# Patient Record
Sex: MALE | Race: WHITE | Employment: FULL TIME | URBAN - METROPOLITAN AREA
[De-identification: names, ages, dates, MRNs, and addresses within clinical notes are randomized per-mention and may not be internally consistent; named-entity substitution may affect disease eponyms.]

---

## 2017-04-06 DIAGNOSIS — I10 HYPERTENSION, GOAL BELOW 140/90: ICD-10-CM

## 2017-04-07 NOTE — TELEPHONE ENCOUNTER
lisinopril       Last Written Prescription Date: 2/15/16  Last Fill Quantity: 90, # refills: 3  Last Office Visit with G, P or [facility] prescribing provider: 2/15/16       Potassium   Date Value Ref Range Status   02/15/2016 4.0 3.4 - 5.3 mmol/L Final     Creatinine   Date Value Ref Range Status   02/15/2016 0.96 0.66 - 1.25 mg/dL Final     BP Readings from Last 3 Encounters:   02/15/16 116/80   07/23/15 (!) 147/98   04/14/15 124/84

## 2017-04-11 RX ORDER — LISINOPRIL 40 MG/1
40 TABLET ORAL DAILY
Qty: 30 TABLET | Refills: 0 | Status: SHIPPED | OUTPATIENT
Start: 2017-04-11 | End: 2017-05-12

## 2017-04-11 NOTE — TELEPHONE ENCOUNTER
Medication is being filled for 1 time refill only due to:   Over due for office visit and/or labs   JANIE Navarro  RN/Carl Mendez

## 2017-05-05 NOTE — PROGRESS NOTES
"  SUBJECTIVE:                                                    Mani Vu is a 47 year old male who presents to clinic today for the following health issues:      Hypertension Follow-up  Lisinopril 40mg qd, Chlorthalidone 25mg qd    Outpatient blood pressures are not being checked.    Low Salt Diet: low salt         Amount of exercise or physical activity: 2-3 days/week for an average of 15-30 minutes    Problems taking medications regularly: No    Medication side effects: Urinary frequency - Initially was tolerable, but now has been more disruptive. Urinating ~15 times per day. Wakes 2-5 times per night to urinate.    Diet: low salt      Family History: NO family history of prostate cancer      Reviewed and updated as needed this visit by clinical staff  Tobacco  Allergies  Meds  Med Hx  Surg Hx  Fam Hx  Soc Hx      Reviewed and updated as needed this visit by Provider         ROS:  C: NEGATIVE for fever, chills, change in weight  CV: NEGATIVE for chest pain, palpitations or peripheral edema  : POSITIVE for urinary frequency, nocturia. NEGATIVE for dysuria or hematuria  NEURO: NEGATIVE for dizziness or weakness      This document serves as a record of the services and decisions personally performed and made by Shania Mclain MD. It was created on his behalf by Shannan Richards, a trained medical scribe. The creation of this document is based the provider's statements to the medical scribe.  Shannan Richards 1:25 PM May 12, 2017    OBJECTIVE:                                                    /78  Pulse 76  Temp 97.5  F (36.4  C) (Tympanic)  Ht 5' 8\" (1.727 m)  Wt 202 lb 2 oz (91.7 kg)  BMI 30.73 kg/m2  Body mass index is 30.73 kg/(m^2).     GENERAL: healthy, alert and no distress  RESP: lungs clear to auscultation - no rales, rhonchi or wheezes  CV: regular rate and rhythm, normal S1 S2, no murmur, no peripheral edema  MS: no gross musculoskeletal defects noted, no edema       ASSESSMENT/PLAN:    "                                                   (I10) Hypertension, goal below 140/90  (primary encounter diagnosis)  Comment: /78, within guidelines. Due to side effect of urinary frequency, will discontinue chlorthalidone and try calcium channel blocker. Check renal function.  Plan: Basic metabolic panel  (Ca, Cl, CO2, Creat,         Gluc, K, Na, BUN), Albumin Random Urine         Quantitative, lisinopril (PRINIVIL/ZESTRIL) 40         MG tablet, amLODIPine (NORVASC) 5 MG tablet - Reviewed potential side effects of amlodipine, including LE edema. Results pending.        Patient will follow up via MyChart, phone, or appointment in 1 month for BP update, 12 months for annual physical, or sooner, PRN. Patient instructed to call with any questions or concerns.    Patient Instructions   *   For the increased urination, stop the chlorthalidone, start a new medication: amlodipine. This doesn't cause the increased urination but can cause some leg swelling.     *  Continue on the lisinopril.     *  Update me in about one month. My Chart would be great.     *  The red spot is called a hemangioma, or cherry moles, harmless. It's a collection of small capillaries under the skin.     *   Yearly check.       The information in this document, created by a scribe for me, accurately reflects the services I personally performed and the decisions made by me. I have reviewed and approved this document for accuracy.  1:35 PM May 12, 2017    Shania Mclain MD  [facility]

## 2017-05-12 ENCOUNTER — OFFICE VISIT (OUTPATIENT)
Dept: FAMILY MEDICINE | Facility: CLINIC | Age: 47
End: 2017-05-12
Payer: COMMERCIAL

## 2017-05-12 VITALS
HEIGHT: 68 IN | DIASTOLIC BLOOD PRESSURE: 78 MMHG | SYSTOLIC BLOOD PRESSURE: 126 MMHG | WEIGHT: 202.13 LBS | HEART RATE: 76 BPM | BODY MASS INDEX: 30.63 KG/M2 | TEMPERATURE: 97.5 F

## 2017-05-12 DIAGNOSIS — R35.0 URINARY FREQUENCY: ICD-10-CM

## 2017-05-12 DIAGNOSIS — I10 HYPERTENSION, GOAL BELOW 140/90: Primary | ICD-10-CM

## 2017-05-12 LAB
ALBUMIN UR-MCNC: NEGATIVE MG/DL
ANION GAP SERPL CALCULATED.3IONS-SCNC: 9 MMOL/L (ref 3–14)
APPEARANCE UR: CLEAR
BILIRUB UR QL STRIP: NEGATIVE
BUN SERPL-MCNC: 15 MG/DL (ref 7–30)
CALCIUM SERPL-MCNC: 9.6 MG/DL (ref 8.5–10.1)
CHLORIDE SERPL-SCNC: 102 MMOL/L (ref 94–109)
CO2 SERPL-SCNC: 28 MMOL/L (ref 20–32)
COLOR UR AUTO: YELLOW
CREAT SERPL-MCNC: 0.91 MG/DL (ref 0.66–1.25)
CREAT UR-MCNC: 28 MG/DL
GFR SERPL CREATININE-BSD FRML MDRD: 90 ML/MIN/1.7M2
GLUCOSE SERPL-MCNC: 84 MG/DL (ref 70–99)
GLUCOSE UR STRIP-MCNC: NEGATIVE MG/DL
HGB UR QL STRIP: NEGATIVE
KETONES UR STRIP-MCNC: NEGATIVE MG/DL
LEUKOCYTE ESTERASE UR QL STRIP: NEGATIVE
MICROALBUMIN UR-MCNC: <5 MG/L
MICROALBUMIN/CREAT UR: NORMAL MG/G CR (ref 0–17)
NITRATE UR QL: NEGATIVE
PH UR STRIP: 6.5 PH (ref 5–7)
POTASSIUM SERPL-SCNC: 3.9 MMOL/L (ref 3.4–5.3)
SODIUM SERPL-SCNC: 139 MMOL/L (ref 133–144)
SP GR UR STRIP: 1.01 (ref 1–1.03)
URN SPEC COLLECT METH UR: NORMAL
UROBILINOGEN UR STRIP-ACNC: 0.2 EU/DL (ref 0.2–1)

## 2017-05-12 PROCEDURE — 80048 BASIC METABOLIC PNL TOTAL CA: CPT | Performed by: FAMILY MEDICINE

## 2017-05-12 PROCEDURE — 82043 UR ALBUMIN QUANTITATIVE: CPT | Performed by: FAMILY MEDICINE

## 2017-05-12 PROCEDURE — 81003 URINALYSIS AUTO W/O SCOPE: CPT | Performed by: FAMILY MEDICINE

## 2017-05-12 PROCEDURE — 99213 OFFICE O/P EST LOW 20 MIN: CPT | Performed by: FAMILY MEDICINE

## 2017-05-12 PROCEDURE — 36415 COLL VENOUS BLD VENIPUNCTURE: CPT | Performed by: FAMILY MEDICINE

## 2017-05-12 RX ORDER — AMLODIPINE BESYLATE 5 MG/1
5 TABLET ORAL DAILY
Qty: 30 TABLET | Refills: 1 | Status: SHIPPED | OUTPATIENT
Start: 2017-05-12 | End: 2017-06-12

## 2017-05-12 RX ORDER — LISINOPRIL 40 MG/1
40 TABLET ORAL DAILY
Qty: 90 TABLET | Refills: 3 | Status: SHIPPED | OUTPATIENT
Start: 2017-05-12 | End: 2018-01-26

## 2017-05-12 RX ORDER — CHLORTHALIDONE 25 MG/1
25 TABLET ORAL DAILY
Qty: 90 TABLET | Refills: 3 | Status: CANCELLED | OUTPATIENT
Start: 2017-05-12

## 2017-05-12 NOTE — NURSING NOTE
"Chief Complaint   Patient presents with     Hypertension       Initial /78  Pulse 76  Temp 97.5  F (36.4  C) (Tympanic)  Ht 5' 8\" (1.727 m)  Wt 202 lb 2 oz (91.7 kg)  BMI 30.73 kg/m2 Estimated body mass index is 30.73 kg/(m^2) as calculated from the following:    Height as of this encounter: 5' 8\" (1.727 m).    Weight as of this encounter: 202 lb 2 oz (91.7 kg).  Medication Reconciliation: complete  "

## 2017-05-12 NOTE — PATIENT INSTRUCTIONS
*   For the increased urination, stop the chlorthalidone, start a new medication: amlodipine. This doesn't cause the increased urination but can cause some leg swelling.     *  Continue on the lisinopril.     *  Update me in about one month. My Chart would be great.     *  The red spot is called a hemangioma, or cherry moles, harmless. It's a collection of small capillaries under the skin.     *   Yearly check.

## 2017-05-12 NOTE — LETTER
[address]  [address]  [phone]      May 15, 2017      Mani KHANNA Fowler  [address] [facility] [address]              Dear Mr. Vu,    Your blood tests show  that you have normal kidney function and there is no signs of diabetes.     Your urine test for protein was normal, no signs of excessive protein in your urine. If elevated, this can be a sign of kidney damage. This test is recommended yearly for people with high blood pressure.     Hopefully, changing your blood pressure medication will help with the frequent urination.     Please call, or use my chart, with any questions or concerns      Sincerely,    Shania Mclain MD/EC CMA

## 2017-05-12 NOTE — MR AVS SNAPSHOT
"              After Visit Summary   5/12/2017    Mani Vu    MRN: [id number]           Patient Information     Date Of Birth          1970        Visit Information        Provider Department      5/12/2017 1:40 PM Shania Mclain MD [facility]        Today's Diagnoses     Hypertension, goal below 140/90    -  1    Urinary frequency          Care Instructions    *   For the increased urination, stop the chlorthalidone, start a new medication: amlodipine. This doesn't cause the increased urination but can cause some leg swelling.     *  Continue on the lisinopril.     *  Update me in about one month. My Chart would be great.     *  The red spot is called a hemangioma, or cherry moles, harmless. It's a collection of small capillaries under the skin.     *   Yearly check.         Follow-ups after your visit        Who to contact     Normal or non-critical lab and imaging results will be communicated to you by [employer], letter or phone within 4 business days after the clinic has received the results. If you do not hear from us within 7 days, please contact the clinic through [employer] or phone. If you have a critical or abnormal lab result, we will notify you by phone as soon as possible.  Submit refill requests through [employer] or call your pharmacy and they will forward the refill request to us. Please allow 3 business days for your refill to be completed.          If you need to speak with a  for additional information , please call: [phone]           Additional Information About Your Visit        [employer] Information     [employer] lets you send messages to your doctor, view your test results, renew your prescriptions, schedule appointments and more. To sign up, go to [email or web address] . Click on \"Log in\" on the left side of the screen, which will take you to the Welcome page. Then click on \"Sign up Now\" on the right side of the page.     You will be asked to " "enter the access code listed below, as well as some personal information. Please follow the directions to create your username and password.     Your access code is: [email or web address]  Expires: 8/10/2017  1:43 PM     Your access code will  in 90 days. If you need help or a new code, please call your [facility] clinic or [phone].        Care EveryWhere ID     This is your Care EveryWhere ID. This could be used by other organizations to access your [facility] medical records  [id number]        Your Vitals Were     Pulse Temperature Height BMI (Body Mass Index)          76 97.5  F (36.4  C) (Tympanic) 5' 8\" (1.727 m) 30.73 kg/m2         Blood Pressure from Last 3 Encounters:   17 126/78   02/15/16 116/80   07/23/15 (!) 147/98    Weight from Last 3 Encounters:   17 202 lb 2 oz (91.7 kg)   02/15/16 188 lb (85.3 kg)   07/23/15 175 lb (79.4 kg)              We Performed the Following     *UA reflex to Microscopic and Culture ([place] [facility] (except [place] and [place])     Albumin Random Urine Quantitative     Basic metabolic panel  (Ca, Cl, CO2, Creat, Gluc, K, Na, BUN)          Today's Medication Changes          These changes are accurate as of: 17  1:43 PM.  If you have any questions, ask your nurse or doctor.               Start taking these medicines.        Dose/Directions    amLODIPine 5 MG tablet   Commonly known as:  NORVASC   Used for:  Hypertension, goal below 140/90   Started by:  Shania Mclain MD        Dose:  5 mg   Take 1 tablet (5 mg) by mouth daily For blood pressure.   Quantity:  30 tablet   Refills:  1         These medicines have changed or have updated prescriptions.        Dose/Directions    lisinopril 40 MG tablet   Commonly known as:  PRINIVIL/ZESTRIL   This may have changed:  additional instructions   Used for:  Hypertension, goal below 140/90   Changed by:  Shania Mclain MD        Dose:  40 mg   Take 1 tablet (40 mg) by mouth daily   Quantity:  90 " tablet   Refills:  3         Stop taking these medicines if you haven't already. Please contact your care team if you have questions.     ANTIHISTAMINE PO   Stopped by:  Shania Mclain MD                Where to get your medicines      These medications were sent to [id number] - [place], [place] - [address] AT [facility] & [place]  [address], [place] [address]     Phone:  [phone]     amLODIPine 5 MG tablet    lisinopril 40 MG tablet                Primary Care Provider Office Phone # Fax #    Shania Mclain [phone]       [facility] [address] [facility]   [place] [address]        Thank you!     Thank you for choosing [facility]  for your care. Our goal is always to provide you with excellent care. Hearing back from our patients is one way we can continue to improve our services. Please take a few minutes to complete the written survey that you may receive in the mail after your visit with us. Thank you!             Your Updated Medication List - Protect others around you: Learn how to safely use, store and throw away your medicines at www.disposemymeds.org.          This list is accurate as of: 5/12/17  1:43 PM.  Always use your most recent med list.                   Brand Name Dispense Instructions for use    amLODIPine 5 MG tablet    NORVASC    30 tablet    Take 1 tablet (5 mg) by mouth daily For blood pressure.       chlorthalidone 25 MG tablet    HYGROTON    90 tablet    Take 1 tablet (25 mg) by mouth daily       lisinopril 40 MG tablet    PRINIVIL/ZESTRIL    90 tablet    Take 1 tablet (40 mg) by mouth daily

## 2017-07-29 ENCOUNTER — TRANSFERRED RECORDS (OUTPATIENT)
Dept: HEALTH INFORMATION MANAGEMENT | Facility: CLINIC | Age: 47
End: 2017-07-29

## 2017-10-30 ENCOUNTER — TRANSFERRED RECORDS (OUTPATIENT)
Dept: HEALTH INFORMATION MANAGEMENT | Facility: CLINIC | Age: 47
End: 2017-10-30

## 2017-11-01 ENCOUNTER — TRANSFERRED RECORDS (OUTPATIENT)
Dept: HEALTH INFORMATION MANAGEMENT | Facility: CLINIC | Age: 47
End: 2017-11-01

## 2018-01-17 DIAGNOSIS — I10 HYPERTENSION, GOAL BELOW 140/90: ICD-10-CM

## 2018-01-17 NOTE — TELEPHONE ENCOUNTER
"Requested Prescriptions   Pending Prescriptions Disp Refills     amLODIPine (NORVASC) 5 MG tablet [Pharmacy Med Name: AMLODIPINE BESYLATE 5MG TABLETS] 90 tablet 0    Last Written Prescription Date:  6/12/17  Last Fill Quantity: 90,  # refills: 1   Last Office Visit with [facility], [facility] or [facility] prescribing provider:  5/12/17   Future Office Visit:      Sig: TAKE 1 TABLET(5 MG) BY MOUTH DAILY FOR BLOOD PRESSURE    Calcium Channel Blockers Protocol  Passed    1/17/2018  5:28 AM       Passed - Blood pressure under 140/90    BP Readings from Last 3 Encounters:   05/12/17 126/78   02/15/16 116/80   07/23/15 (!) 147/98                Passed - Recent or future visit with authorizing provider    Patient had office visit in the last year or has a visit in the next 30 days with authorizing provider.  See \"Patient Info\" tab in inbasket, or \"Choose Columns\" in Meds & Orders section of the refill encounter.            Passed - Patient is age 18 or older       Passed - Normal serum creatinine on file in past 12 months    Recent Labs   Lab Test  05/12/17   1349   CR  0.91               "

## 2018-01-19 NOTE — PROGRESS NOTES
"  SUBJECTIVE:   Mani Vu is a 47 year old male who presents to clinic today for the following health issues:      Hypertension Follow-up   amlodipine 5mg qd, lisinopril 40mg qd    Outpatient blood pressures are not being checked.    Low Salt Diet: low salt      - Family history of premature heart disease (Father -  from massive MI at age 55).    - He has lost weight with low-carb diet and exercise.    - Urinary frequency discussed at last visit has improved with drinking less fluids in the evening.        Amount of exercise or physical activity: 2-3 days/week for an average of 15-30 minutes    Problems taking medications regularly: No    Medication side effects: none    Diet: low salt        Problem list, Medication list, Allergies, and Medical/Social/Surgical/Family histories reviewed in [facility] and updated as appropriate.    Labs reviewed in [facility]      ROS:  Constitutional, HEENT, cardiovascular, pulmonary, gi and gu systems are negative, except as otherwise noted.        This document serves as a record of the services and decisions personally performed and made by Shania Mclain MD. It was created on his behalf by Shannan Richards, a trained medical scribe. The creation of this document is based the provider's statements to the medical scribe.  Shannan Richards 4:03 PM 2018  OBJECTIVE:     /78  Pulse 64  Ht 5' 8\" (1.727 m)  Wt 171 lb 2 oz (77.6 kg)  BMI 26.02 kg/m2  Body mass index is 26.02 kg/(m^2).     GENERAL: Healthy, alert and no distress  EYES: Eyes grossly normal to inspection, conjunctivae and sclerae normal  RESP: Lungs clear to auscultation - no rales, rhonchi or wheezes  CV: Regular rate and rhythm, normal S1 S2, no murmur  MS: No gross musculoskeletal defects noted, no edema  NEURO: Normal strength and tone, mentation intact and speech normal  PSYCH: Mentation appears normal, affect normal/bright      Wt Readings from Last 4 Encounters:   18 171 lb 2 oz (77.6 kg) "   17 202 lb 2 oz (91.7 kg)   02/15/16 188 lb (85.3 kg)   07/23/15 175 lb (79.4 kg)     Albumin urine and BMP 2017 - normal  Lipids 2014 - normal    ASSESSMENT/PLAN:     (I10) Hypertension, goal below 140/90  (primary encounter diagnosis)  Comment: /78, within guidelines on ACE inhibitor and calcium channel blocker. Renal function intact. With recent weight loss and healthy lifestyle changes, consider decreasing these medications in the future. However, he is tolerating these well, so will plan to continue for now.  Plan: amLODIPine (NORVASC) 5 MG tablet, lisinopril         (PRINIVIL/ZESTRIL) 40 MG tablet - Refilled    (Z82.49) Family history of premature CAD  Comment: Father  from massive MI at age 55. Discussed that there may be some benefit to taking low-dose statin as primary prevention given his family history. At this time, he would like to hold on this and re-address at next visit.   Plan: Encouraged to continue healthy lifestyle changes. Consider starting statin at next visit.        Patient will follow up in 12 months or sooner, PRN. Patient instructed to call with any questions or concerns.      Patient Instructions   * Refills on amlodipine and lisinopril for 1 year    * Congratulations on the weight loss    * Keep up the healthy lifestyle changes    * Follow-up in 1 year.  We can see if you'd like to like to take a low dose cholesterol medication.           The information in this document, created by a scribe for me, accurately reflects the services I personally performed and the decisions made by me. I have reviewed and approved this document for accuracy.  4:14 PM 2018      Shania Mclain MD  [facility]

## 2018-01-22 RX ORDER — AMLODIPINE BESYLATE 5 MG/1
TABLET ORAL
Qty: 90 TABLET | Refills: 1 | Status: SHIPPED | OUTPATIENT
Start: 2018-01-22 | End: 2018-01-26

## 2018-01-22 NOTE — TELEPHONE ENCOUNTER
Prescription approved per [facility] Refill Protocol or patient Primary care provider (PCP)  JANIE Navarro RN/Carl Mendez

## 2018-01-26 ENCOUNTER — OFFICE VISIT (OUTPATIENT)
Dept: FAMILY MEDICINE | Facility: CLINIC | Age: 48
End: 2018-01-26
Payer: COMMERCIAL

## 2018-01-26 VITALS
WEIGHT: 171.13 LBS | DIASTOLIC BLOOD PRESSURE: 78 MMHG | BODY MASS INDEX: 25.93 KG/M2 | HEIGHT: 68 IN | HEART RATE: 64 BPM | SYSTOLIC BLOOD PRESSURE: 120 MMHG

## 2018-01-26 DIAGNOSIS — Z82.49 FAMILY HISTORY OF PREMATURE CAD: ICD-10-CM

## 2018-01-26 DIAGNOSIS — I10 HYPERTENSION, GOAL BELOW 140/90: Primary | ICD-10-CM

## 2018-01-26 PROCEDURE — 99213 OFFICE O/P EST LOW 20 MIN: CPT | Performed by: FAMILY MEDICINE

## 2018-01-26 RX ORDER — LISINOPRIL 40 MG/1
40 TABLET ORAL DAILY
Qty: 90 TABLET | Refills: 3 | Status: SHIPPED | OUTPATIENT
Start: 2018-01-26 | End: 2019-01-05

## 2018-01-26 RX ORDER — AMLODIPINE BESYLATE 5 MG/1
TABLET ORAL
Qty: 90 TABLET | Refills: 3 | Status: SHIPPED | OUTPATIENT
Start: 2018-01-26 | End: 2019-01-05

## 2018-01-26 NOTE — PATIENT INSTRUCTIONS
* Refills on amlodipine and lisinopril for 1 year    * Congratulations on the weight loss    * Keep up the healthy lifestyle changes    * Follow-up in 1 year.  We can see if you'd like to like to take a low dose cholesterol medication.

## 2018-01-26 NOTE — MR AVS SNAPSHOT
After Visit Summary   1/26/2018    Mani Vu    MRN: [id number]           Patient Information     Date Of Birth          1970        Visit Information        Provider Department      1/26/2018 4:00 PM Shania Mclain MD [facility]        Today's Diagnoses     Hypertension, goal below 140/90    -  1    Family history of premature CAD          Care Instructions    * Refills on amlodipine and lisinopril for 1 year    * Congratulations on the weight loss    * Keep up the healthy lifestyle changes    * Follow-up in 1 year.  We can see if you'd like to like to take a low dose cholesterol medication.               Follow-ups after your visit        Who to contact     Normal or non-critical lab and imaging results will be communicated to you by [employer], letter or phone within 4 business days after the clinic has received the results. If you do not hear from us within 7 days, please contact the clinic through [employer] or phone. If you have a critical or abnormal lab result, we will notify you by phone as soon as possible.  Submit refill requests through "[employer] or call your pharmacy and they will forward the refill request to us. Please allow 3 business days for your refill to be completed.          If you need to speak with a  for additional information , please call: [phone]           Additional Information About Your Visit        "[employer] Information     "[employer] gives you secure access to your electronic health record. If you see a primary care provider, you can also send messages to your care team and make appointments. If you have questions, please call your primary care clinic.  If you do not have a primary care provider, please call [phone] and they will assist you.        Care EveryWhere ID     This is your Care EveryWhere ID. This could be used by other organizations to access your [facility] medical records  [id number]        Your Vitals Were  "    Pulse Height BMI (Body Mass Index)             64 5' 8\" (1.727 m) 26.02 kg/m2          Blood Pressure from Last 3 Encounters:   01/26/18 120/78   05/12/17 126/78   02/15/16 116/80    Weight from Last 3 Encounters:   01/26/18 171 lb 2 oz (77.6 kg)   05/12/17 202 lb 2 oz (91.7 kg)   02/15/16 188 lb (85.3 kg)              Today, you had the following     No orders found for display         Today's Medication Changes          These changes are accurate as of 1/26/18  4:21 PM.  If you have any questions, ask your nurse or doctor.               These medicines have changed or have updated prescriptions.        Dose/Directions    amLODIPine 5 MG tablet   Commonly known as:  NORVASC   This may have changed:  See the new instructions.   Used for:  Hypertension, goal below 140/90   Changed by:  Shania Mclain MD        TAKE 1 TABLET(5 MG) BY MOUTH DAILY FOR BLOOD PRESSURE   Quantity:  90 tablet   Refills:  3            Where to get your medicines      These medications were [facility] to [id number] - [place] - [address] [place] [address] AT [facility]  [address] [place] [address] [place] [address]     Phone:  [phone]     amLODIPine 5 MG tablet    lisinopril 40 MG tablet                Primary Care Provider Office Phone # Fax #    Shania Mclain [phone] [address] [place] [address]        Equal Access to Services     [facility] AH: Hadii lor ku hadasho Soomaali, waaxda luqadaha, qaybta kaalmada adeegyada, waxay magalys dennis. So [facility] [phone].    ATENCIÓN: Si habla español, tiene a sanchez disposición servicios gratuitos de asistencia lingüística. Llame al [phone].    We comply with applicable federal civil rights laws and [place] laws. We do not discriminate on the basis of race, color, national origin, age, disability, sex, sexual orientation, or gender identity.            Thank you!     Thank you for choosing [facility]  for your care. Our goal is always to provide you with excellent care. Hearing back from our patients is one way we can continue to improve our services. Please take a few minutes to complete the written survey that you may receive in the mail after your visit with us. Thank you!             Your Updated Medication List - Protect others around you: Learn how to safely use, store and throw away your medicines at www.disposemymeds.org.          This list is accurate as of 1/26/18  4:21 PM.  Always use your most recent med list.                   Brand Name Dispense Instructions for use Diagnosis    amLODIPine 5 MG tablet    NORVASC    90 tablet    TAKE 1 TABLET(5 MG) BY MOUTH DAILY FOR BLOOD PRESSURE    Hypertension, goal below 140/90       lisinopril 40 MG tablet    PRINIVIL/ZESTRIL    90 tablet    Take 1 tablet (40 mg) by mouth daily    Hypertension, goal below 140/90

## 2018-01-26 NOTE — NURSING NOTE
"Chief Complaint   Patient presents with     Hypertension       Initial /78  Pulse 64  Ht 5' 8\" (1.727 m)  Wt 171 lb 2 oz (77.6 kg)  BMI 26.02 kg/m2 Estimated body mass index is 26.02 kg/(m^2) as calculated from the following:    Height as of this encounter: 5' 8\" (1.727 m).    Weight as of this encounter: 171 lb 2 oz (77.6 kg).  Medication Reconciliation: complete  "

## 2018-04-29 ENCOUNTER — TRANSFERRED RECORDS (OUTPATIENT)
Dept: HEALTH INFORMATION MANAGEMENT | Facility: CLINIC | Age: 48
End: 2018-04-29

## 2018-05-01 ENCOUNTER — TELEPHONE (OUTPATIENT)
Dept: CARE COORDINATION | Facility: CLINIC | Age: 48
End: 2018-05-01

## 2018-05-01 DIAGNOSIS — Z71.89 COMPLEX CARE COORDINATION: Primary | ICD-10-CM

## 2018-05-01 NOTE — TELEPHONE ENCOUNTER
DC'd from [facility] on 4-29-18 to home self care   Primary Problem: er discharge  LACE: 55 moderate

## 2018-05-02 ENCOUNTER — PATIENT OUTREACH (OUTPATIENT)
Dept: CARE COORDINATION | Facility: CLINIC | Age: 48
End: 2018-05-02

## 2018-05-02 PROBLEM — F10.10 ALCOHOL ABUSE: Status: ACTIVE | Noted: 2018-05-02

## 2018-05-02 NOTE — PROGRESS NOTES
Clinic Care Coordination Contact  [facility]/Voicemail    [facility] received referral from [facility] services as pt has been in the [facility] ED twice in the past week.  4-29 with ETOH intoxication; pt was discharged to home once sober and on 5-1, pt was sent to [facility] detoxification unit per his request.  Per EMR notes, pt has been binge drinking for 10+ days.  Pt presented with BAL of .339.  Pt may very well be at [facility]'s Detox Unit.     Clinical Data: Care Coordinator Outreach  Outreach attempted x 1.  Left message on voicemail with call back information and requested return call.  Plan: Care Coordinator mailed out care coordination introduction letter on 5-2-18. Care Coordinator will try to reach patient again in 1-2 business days.    Elizabeth Brandt  Social Work Care Coordinator  [facility]  [phone]

## 2018-05-02 NOTE — LETTER
Health Care Home - Access Care Plan    About Me:  Patient Name:  Mani Vu    YOB: 1970  Age:   48 year old   [place] MRN:  [id number] Telephone Information:     Home Phone [phone]   Mobile [phone]       Address:    [address] [facility] [place] [address] Email address:  [email or web address]      Emergency Contact(s)  Name Relationship Lgl Grd Work Phone Home Phone Mobile Phone   1. BRODIE, SA* Spouse   [phone]   2. NO SECONDARY C*                  Health Maintenance:  Due/Overdue    My Access Plan  Medical Emergency 911   Questions or concerns during clinic hours Primary Clinic Line, I will call the clinic directly: [facility] Carl Mendez - [phone]   24 Hour Appointment Line [phone] or  [phone]) (toll free)   24 Hour Nurse Line [phone] (toll free)   Questions or concerns outside clinic hours 24 Hour Appointment Line, I will call the after-hours on-call line:   [facility] [phone] or [phone]) (toll-free)   Preferred Urgent Care Other ([facility] )   Preferred Hospital [facility]  [phone]   Preferred Pharmacy [id number] - [place], [place] - [address]     Behavioral Health Crisis Line The National Suicide Prevention Lifeline at 1-829.621.5737 or 914       My Care Team Members  Patient Care Team       Relationship Specialty Notifications Start End    Shania Mclain MD PCP - General Family Practice  3/26/12     Phone: [phone] Fax: [phone] [address] [place] [address]    Elizabeth Campoverde LSW Clinic Care Coordinator Primary Care - CC Admissions 5/2/18     Phone: [phone] Fax: [phone]               My Medical and Care Information  Problem List   Patient Active Problem List   Diagnosis     CARDIOVASCULAR SCREENING; LDL GOAL LESS THAN 160     Hypertension, goal below 140/90     24 hour handout  given     Family history of premature CAD      Current Medications and Allergies:  See printed Medication Report

## 2018-05-02 NOTE — LETTER
[facility]  [address] [place] [address]  [phone]      May 2, 2018    Mani Vu  [address]  [facility] [address]      Dear Mani,    I am a clinic care coordinator- who works with Shania Mclain MD at the [facility]. I recently tried to call and was unable to reach you. I wanted to introduce myself and provide you with my contact information so that you can call me with questions or concerns about your health care. Below is a description of clinic care coordination and how I can further assist you.     The clinic care coordinator is a registered nurse and/or  who understand the health care system. The goal of clinic care coordination is to help you manage your health and improve access to the [facility] system in the most efficient manner. The registered nurse can assist you in meeting your health care goals by providing education, coordinating services, and strengthening the communication among your providers. The  can assist you with financial, behavioral, psychosocial, chemical dependency, counseling, and/or psychiatric resources.    Please feel free to contact me at [phone], with any questions or concerns. We at [facility] are focused on providing you with the highest-quality healthcare experience possible and that all starts with you.     Sincerely,     Elizabeth Campoverde    Enclosed: I have enclosed a copy of a 24 Hour Access Plan. This has helpful phone numbers for you to call when needed. Please keep this in an easy to access place to use as needed.

## 2018-05-04 NOTE — TELEPHONE ENCOUNTER
Clinic Care Coordination Contact  [facility]/Voicemail    [email or web address]  Clinical Data: Care Coordinator Outreach  Outreach attempted x 2.  Left message on voicemail with call back information and requested return call.  Plan: Care Coordinator 5-2-18. Care Coordinator will do no further outreaches at this time.    Elizabeth Brandt  Social Work Care Coordinator  [facility]  [phone]

## 2018-05-04 NOTE — PROGRESS NOTES
Clinic Care Coordination Contact  [facility]/Voicemail       Clinical Data: Care Coordinator Outreach  Outreach attempted x 2.  Left message on voicemail with call back information and requested return call.  Plan: Care Coordinator mailed out care coordination introduction letter on 5-2-18. Care Coordinator will do no further outreaches at this time.    Elizabeth Brandt  Social Work Care Coordinator  [facility]  [phone]

## 2018-11-16 NOTE — PROGRESS NOTES
SUBJECTIVE:   Mani Vu is a 48 year old male who presents to clinic today for the following health issues:      Hypertension Follow-up  Amlodipine 5mg every day, lisinopril 40mg qd    Outpatient blood pressures are being checked at home.  Results are 120s/70-80s.    Low Salt Diet: low salt    Amount of exercise or physical activity: Jogging 3-4 days per week    Problems taking medications regularly: No    Medication side effects: none    Diet: low salt, low carb    BP Readings from Last 6 Encounters:   11/23/18 118/70   01/26/18 120/78   05/12/17 126/78   02/15/16 116/80   07/23/15 (!) 147/98   04/14/15 124/84     Urinary Concerns  Patient presents with concerns of Intermittent urinary frequency since May 2017. States no improvement since last visit During the daytime he urinates once per hour and is waking at night 3-5 times to use the bathroom at night. Wakes up with an erection at when has to urinate.  No dysuria, burning, fevers, mass/lumps on testicle, urethral discharge. There is some hesitancy. He is unsure of any family hx of prostate problems.    Alopecia  Hair loss mainly in front of head. FHX: father    ETOH abuse  Patient states that he no long drinking ETOH.  Was able to quit on own and doesn't miss drinking.     Problem list and histories reviewed & adjusted, as indicated.  Additional history: as documented    Labs reviewed in EPIC    Reviewed and updated as needed this visit by clinical staff  Allergies  Meds  Med Hx  Surg Hx  Fam Hx  Soc Hx      Reviewed and updated as needed this visit by Provider       ROS:  CONSTITUTIONAL: NEGATIVE for fever, chills, change in weight  ENT/MOUTH: NEGATIVE for ear, mouth and throat problems  RESP: NEGATIVE for significant cough or SOB  CV: NEGATIVE for chest pain, palpitations or peripheral edema  GI: NEGATIVE for nausea, abdominal pain, heartburn, or change in bowel habits  : POSITIVE for frequency, hesitancy and nocturia up to 5 times a  "night  ENDO: POSITIVE for hairloss  PSYCHIATRIC: NEGATIVE for changes in mood or affect POSITIVE for hx of ETOH abuse    This document serves as a record of the services and decisions personally performed and made by Shania Mclain MD. It was created on his behalf by Bj Jacob, a trained medical scribe. The creation of this document is based the provider's statements to the medical scribe.  Bj Jacob 1:32 PM November 23, 2018    OBJECTIVE:                                                    /70  Pulse 74  Ht 5' 8\" (1.727 m)  Wt 174 lb 6 oz (79.1 kg)  BMI 26.51 kg/m2  Body mass index is 26.51 kg/(m^2).   GENERAL: healthy, alert, well nourished, well hydrated, no distress  HENT: ear canals- normal; TMs- normal; Nose- normal; Mouth- no ulcers, no lesions  NECK: no tenderness, no adenopathy, no asymmetry, no masses, no stiffness; thyroid- normal to palpation  RESP: lungs clear to auscultation - no rales, no rhonchi, no wheezes  CV: regular rates and rhythm, normal S1 S2  ABDOMEN: soft, no tenderness      Diagnostic Test Results:  Urinalysis - negative  Results for orders placed or performed in visit on 11/23/18   Basic metabolic panel  (Ca, Cl, CO2, Creat, Gluc, K, Na, BUN)   Result Value Ref Range    Sodium 137 133 - 144 mmol/L    Potassium 3.8 3.4 - 5.3 mmol/L    Chloride 102 94 - 109 mmol/L    Carbon Dioxide 29 20 - 32 mmol/L    Anion Gap 6 3 - 14 mmol/L    Glucose 92 70 - 99 mg/dL    Urea Nitrogen 19 7 - 30 mg/dL    Creatinine 0.90 0.66 - 1.25 mg/dL    GFR Estimate >90 >60 mL/min/1.7m2    GFR Estimate If Black >90 >60 mL/min/1.7m2    Calcium 9.5 8.5 - 10.1 mg/dL   Albumin Random Urine Quantitative with Creat Ratio   Result Value Ref Range    Creatinine Urine 30 mg/dL    Albumin Urine mg/L <5 mg/L    Albumin Urine mg/g Cr Unable to calculate due to low value 0 - 17 mg/g Cr   UA reflex to Microscopic and Culture   Result Value Ref Range    Color Urine Yellow     Appearance Urine Clear     Glucose Urine " Negative NEG^Negative mg/dL    Bilirubin Urine Negative NEG^Negative    Ketones Urine Negative NEG^Negative mg/dL    Specific Gravity Urine 1.010 1.003 - 1.035    Blood Urine Negative NEG^Negative    pH Urine 6.0 5.0 - 7.0 pH    Protein Albumin Urine Negative NEG^Negative mg/dL    Urobilinogen Urine 0.2 0.2 - 1.0 EU/dL    Nitrite Urine Negative NEG^Negative    Leukocyte Esterase Urine Negative NEG^Negative    Source Midstream Urine    Lipid panel reflex to direct LDL Fasting   Result Value Ref Range    Cholesterol 211 (H) <200 mg/dL    Triglycerides 49 <150 mg/dL    HDL Cholesterol 59 >39 mg/dL    LDL Cholesterol Calculated 142 (H) <100 mg/dL    Non HDL Cholesterol 152 (H) <130 mg/dL         ASSESSMENT/PLAN:                                                    (I10) Hypertension, goal below 140/90  (primary encounter diagnosis)  Comment: Within guidelines on single drug therapy.  Excellent renal function.  Plan: Basic metabolic panel  (Ca, Cl, CO2, Creat,         Gluc, K, Na, BUN), Albumin Random Urine         Quantitative with Creat Ratio        Continue amlodipine.  1 year refill.    (R35.0) Urinary frequency  Comment: Duration approximately 2 years.  Urinalysis negative.  No evidence of diabetes.  Will treat for possible chronic prostatitis and see if his symptoms improved.  If not, advised urology referral.  Plan: ciprofloxacin (CIPRO) 500 MG tablet, UROLOGY         ADULT REFERRAL        (F10.10) Alcohol abuse  Comment: When asked, patient did not elaborate but stated that alcohol was not an issue.  I did not pursue this further.      (L64.9) Alopecia, male pattern  Comment: Discussed options.  Reviewed that finasteride is a treatment for prostate enlargement.  For now, we will start at the dose to use for the treatment of male pattern baldness.  If there is evidence of BPH, consider increasing the dose to 5 mg daily.  Plan: finasteride (PROPECIA) 1 MG tablet      Patient Instructions   *   Will check for  diabetes, cholesterol,  bladder infection.     *   Will try a course of antibiotics for a possible low grade infection. Cipro.     *   If not better, see urology: [phone].     *   We can start a medication for hair loss: finasteride or Propecia.       Follow up with Provider - Follow up as needed   See Patient Instructions    The information in this document, created by a scribe for me, accurately reflects the services I personally performed and the decisions made by me. I have reviewed and approved this document for accuracy.     Shania Mclain MD  [facility]

## 2018-11-23 ENCOUNTER — OFFICE VISIT (OUTPATIENT)
Dept: FAMILY MEDICINE | Facility: CLINIC | Age: 48
End: 2018-11-23
Payer: COMMERCIAL

## 2018-11-23 VITALS
HEART RATE: 74 BPM | DIASTOLIC BLOOD PRESSURE: 70 MMHG | WEIGHT: 174.38 LBS | BODY MASS INDEX: 26.43 KG/M2 | SYSTOLIC BLOOD PRESSURE: 118 MMHG | HEIGHT: 68 IN

## 2018-11-23 DIAGNOSIS — I10 HYPERTENSION, GOAL BELOW 140/90: Primary | ICD-10-CM

## 2018-11-23 DIAGNOSIS — Z13.6 CARDIOVASCULAR SCREENING; LDL GOAL LESS THAN 160: ICD-10-CM

## 2018-11-23 DIAGNOSIS — R35.0 URINARY FREQUENCY: ICD-10-CM

## 2018-11-23 DIAGNOSIS — F10.10 ALCOHOL ABUSE: ICD-10-CM

## 2018-11-23 DIAGNOSIS — L64.9 ALOPECIA, MALE PATTERN: ICD-10-CM

## 2018-11-23 LAB
ALBUMIN UR-MCNC: NEGATIVE MG/DL
ANION GAP SERPL CALCULATED.3IONS-SCNC: 6 MMOL/L (ref 3–14)
APPEARANCE UR: CLEAR
BILIRUB UR QL STRIP: NEGATIVE
BUN SERPL-MCNC: 19 MG/DL (ref 7–30)
CALCIUM SERPL-MCNC: 9.5 MG/DL (ref 8.5–10.1)
CHLORIDE SERPL-SCNC: 102 MMOL/L (ref 94–109)
CHOLEST SERPL-MCNC: 211 MG/DL
CO2 SERPL-SCNC: 29 MMOL/L (ref 20–32)
COLOR UR AUTO: YELLOW
CREAT SERPL-MCNC: 0.9 MG/DL (ref 0.66–1.25)
CREAT UR-MCNC: 30 MG/DL
GFR SERPL CREATININE-BSD FRML MDRD: >90 ML/MIN/1.7M2
GLUCOSE SERPL-MCNC: 92 MG/DL (ref 70–99)
GLUCOSE UR STRIP-MCNC: NEGATIVE MG/DL
HDLC SERPL-MCNC: 59 MG/DL
HGB UR QL STRIP: NEGATIVE
KETONES UR STRIP-MCNC: NEGATIVE MG/DL
LDLC SERPL CALC-MCNC: 142 MG/DL
LEUKOCYTE ESTERASE UR QL STRIP: NEGATIVE
MICROALBUMIN UR-MCNC: <5 MG/L
MICROALBUMIN/CREAT UR: NORMAL MG/G CR (ref 0–17)
NITRATE UR QL: NEGATIVE
NONHDLC SERPL-MCNC: 152 MG/DL
PH UR STRIP: 6 PH (ref 5–7)
POTASSIUM SERPL-SCNC: 3.8 MMOL/L (ref 3.4–5.3)
SODIUM SERPL-SCNC: 137 MMOL/L (ref 133–144)
SOURCE: NORMAL
SP GR UR STRIP: 1.01 (ref 1–1.03)
TRIGL SERPL-MCNC: 49 MG/DL
UROBILINOGEN UR STRIP-ACNC: 0.2 EU/DL (ref 0.2–1)

## 2018-11-23 PROCEDURE — 81003 URINALYSIS AUTO W/O SCOPE: CPT | Performed by: FAMILY MEDICINE

## 2018-11-23 PROCEDURE — 36415 COLL VENOUS BLD VENIPUNCTURE: CPT | Performed by: FAMILY MEDICINE

## 2018-11-23 PROCEDURE — 80061 LIPID PANEL: CPT | Performed by: FAMILY MEDICINE

## 2018-11-23 PROCEDURE — 80048 BASIC METABOLIC PNL TOTAL CA: CPT | Performed by: FAMILY MEDICINE

## 2018-11-23 PROCEDURE — 99214 OFFICE O/P EST MOD 30 MIN: CPT | Performed by: FAMILY MEDICINE

## 2018-11-23 PROCEDURE — 82043 UR ALBUMIN QUANTITATIVE: CPT | Performed by: FAMILY MEDICINE

## 2018-11-23 RX ORDER — CIPROFLOXACIN 500 MG/1
500 TABLET, FILM COATED ORAL 2 TIMES DAILY
Qty: 28 TABLET | Refills: 0 | Status: SHIPPED | OUTPATIENT
Start: 2018-11-23 | End: 2018-12-18

## 2018-11-23 RX ORDER — FINASTERIDE 1 MG/1
1 TABLET, FILM COATED ORAL DAILY
Qty: 90 TABLET | Refills: 3 | Status: SHIPPED | OUTPATIENT
Start: 2018-11-23 | End: 2018-12-04

## 2018-11-23 NOTE — MR AVS SNAPSHOT
After Visit Summary   11/23/2018    Mani Vu    MRN: [id number]           Patient Information     Date Of Birth          1970        Visit Information        Provider Department      11/23/2018 1:20 PM Shania Mclain MD [facility]        Today's Diagnoses     Hypertension, goal below 140/90    -  1    Urinary frequency        Alcohol abuse        Alopecia, male pattern        CARDIOVASCULAR SCREENING; LDL GOAL LESS THAN 160          Care Instructions    *   Will check for diabetes, cholesterol,  bladder infection.     *   Will try a course of antibiotics for a possible low grade infection. Cipro.     *   If not better, see urology: [phone].     *   We can start a medication for hair loss: finasteride or Propecia.           Follow-ups after your visit        Additional Services     UROLOGY ADULT REFERRAL       Your provider has referred you to: [facility]: [facility] [phone]   [email or web address]    Please be aware that coverage of these services is subject to the terms and limitations of your health insurance plan.  Call member services at your health plan with any benefit or coverage questions.      Please bring the following with you to your appointment:    (1) Any X-Rays, CTs or MRIs which have been performed.  Contact the facility where they were done to arrange for  prior to your scheduled appointment.    (2) List of current medications  (3) This referral request   (4) Any documents/labs given to you for this referral                  Who to contact     Normal or non-critical lab and imaging results will be communicated to you by MyChart, letter or phone within 4 business days after the clinic has received the results. If you do not hear from us within 7 days, please contact the clinic through MyChart or phone. If you have a critical or abnormal  "lab result, we will notify you by phone as soon as possible.  Submit refill requests through [facility] or call your pharmacy and they will forward the refill request to us. Please allow 3 business days for your refill to be completed.          If you need to speak with a  for additional information , please call: [phone]           Additional Information About Your Visit        [facility] Information     [facility] gives you secure access to your electronic health record. If you see a primary care provider, you can also send messages to your care team and make appointments. If you have questions, please call your primary care clinic.  If you do not have a primary care provider, please call [phone] and they will assist you.        Care EveryWhere ID     This is your Care EveryWhere ID. This could be used by other organizations to access your [facility] medical records  [id number]        Your Vitals Were     Pulse Height BMI (Body Mass Index)             74 5' 8\" (1.727 m) 26.51 kg/m2          Blood Pressure from Last 3 Encounters:   11/23/18 118/70   01/26/18 120/78   05/12/17 126/78    Weight from Last 3 Encounters:   11/23/18 174 lb 6 oz (79.1 kg)   01/26/18 171 lb 2 oz (77.6 kg)   05/12/17 202 lb 2 oz (91.7 kg)              We Performed the Following     Albumin Random Urine Quantitative with Creat Ratio     Basic metabolic panel  (Ca, Cl, CO2, Creat, Gluc, K, Na, BUN)     Lipid panel reflex to direct LDL Fasting     UA reflex to Microscopic and Culture     UROLOGY ADULT REFERRAL          Today's Medication Changes          These changes are accurate as of 11/23/18  1:26 PM.  If you have any questions, ask your nurse or doctor.               Start taking these medicines.        Dose/Directions    ciprofloxacin 500 MG tablet   Commonly known as:  CIPRO   Used for:  Urinary frequency   Started by:  Shania Mclain MD        Dose:  500 mg   Take 1 tablet (500 mg) by mouth 2 times daily for 14 " days   Quantity:  28 tablet   Refills:  0       finasteride 1 MG tablet   Commonly known as:  PROPECIA   Used for:  Alopecia, male pattern   Started by:  Shania Mclain MD        Dose:  1 mg   Take 1 tablet (1 mg) by mouth daily   Quantity:  90 tablet   Refills:  3            Where to get your medicines      These medications were sent to [id number] - [place], [place] - [address]  [address] [place] [address]     Phone:  [phone]     ciprofloxacin 500 MG tablet    finasteride 1 MG tablet                Primary Care Provider Office Phone # Fax #    Shania Mclain [phone] [address] [place] [address]        Equal Access to Services     DEBBIE [facility] SystemABHIJEET AH: Hadii lor ku hadasho Soomaali, waaxda luqadaha, qaybta kaalmada adeegyada, waxay idiin hayaan tommie khroxane tomas . So [place] -[phone].    ATENCIÓN: Si habla español, tiene a sanchez disposición servicios gratuitos de asistencia lingüística. Llame al [phone].    We comply with applicable federal civil rights laws and [place] laws. We do not discriminate on the basis of race, color, national origin, age, disability, sex, sexual orientation, or gender identity.            Thank you!     Thank you for choosing [facility]  for your care. Our goal is always to provide you with excellent care. Hearing back from our patients is one way we can continue to improve our services. Please take a few minutes to complete the written survey that you may receive in the mail after your visit with us. Thank you!             Your Updated Medication List - Protect others around you: Learn how to safely use, store and throw away your medicines at www.disposemymeds.org.          This list is accurate as of 11/23/18  1:26 PM.  Always use your most recent med list.                   Brand Name Dispense Instructions for use Diagnosis    amLODIPine 5 MG tablet    NORVASC     90 tablet    TAKE 1 TABLET(5 MG) BY MOUTH DAILY FOR BLOOD PRESSURE    Hypertension, goal below 140/90       ciprofloxacin 500 MG tablet    CIPRO    28 tablet    Take 1 tablet (500 mg) by mouth 2 times daily for 14 days    Urinary frequency       finasteride 1 MG tablet    PROPECIA    90 tablet    Take 1 tablet (1 mg) by mouth daily    Alopecia, male pattern       lisinopril 40 MG tablet    PRINIVIL/ZESTRIL    90 tablet    Take 1 tablet (40 mg) by mouth daily    Hypertension, goal below 140/90

## 2018-11-23 NOTE — PATIENT INSTRUCTIONS
*   Will check for diabetes, cholesterol,  bladder infection.     *   Will try a course of antibiotics for a possible low grade infection. Cipro.     *   If not better, see urology: [phone].     *   We can start a medication for hair loss: finasteride or Propecia.

## 2018-11-27 ENCOUNTER — TELEPHONE (OUTPATIENT)
Dept: FAMILY MEDICINE | Facility: CLINIC | Age: 48
End: 2018-11-27

## 2018-11-27 DIAGNOSIS — L65.9 HAIR LOSS: Primary | ICD-10-CM

## 2018-11-27 NOTE — TELEPHONE ENCOUNTER
Received a Rejection Message from [facility] Kelsi Urban for Finasteride 1mg    Rejection message states the medication is a plan exclusion, Prior Authorization (PA) is not an option.    Looks like the 30 tabs would be $75.99, according to our pharmacy the 5mg would be cheaper, the patient would have to cut them down.    Routing to the provider.        August Anand RT (r)  [facility]

## 2018-12-04 RX ORDER — FINASTERIDE 5 MG/1
TABLET, FILM COATED ORAL
Qty: 30 TABLET | Refills: 3 | Status: SHIPPED | OUTPATIENT
Start: 2018-12-04 | End: 2019-12-31

## 2019-01-05 DIAGNOSIS — I10 HYPERTENSION, GOAL BELOW 140/90: ICD-10-CM

## 2019-01-07 NOTE — TELEPHONE ENCOUNTER
"Requested Prescriptions   Pending Prescriptions Disp Refills     lisinopril (PRINIVIL/ZESTRIL) 40 MG tablet [Pharmacy Med Name: LISINOPRIL 40 MG TABLET] 90 tablet 3    Last Written Prescription Date:  01/26/2018 #90 x 3  Last filled 10/09/2018  Last office visit: 11/23/2018 WILLIAM Mclain   [facility] Office Visit: None    Sig: TAKE 1 TABLET (40 MG) BY MOUTH DAILY    ACE Inhibitors (Including Combos) Protocol Passed - 1/5/2019 12:31 AM       Passed - Blood pressure under 140/90 in past 12 months    BP Readings from Last 3 Encounters:   11/23/18 118/70   01/26/18 120/78   05/12/17 126/78                Passed - Recent (12 mo) or future (30 days) visit within the authorizing provider's specialty    Patient had office visit in the last 12 months or has a visit in the next 30 days with authorizing provider or within the authorizing provider's specialty.  See \"Patient Info\" tab in inbasket, or \"Choose Columns\" in Meds & Orders section of the refill encounter.             Passed - Medication is active on med list       Passed - Patient is age 18 or older       Passed - Normal serum creatinine on file in past 12 months    Recent Labs   Lab Test 11/23/18  1332   CR 0.90            Passed - Normal serum potassium on file in past 12 months    Recent Labs   Lab Test 11/23/18  1332   POTASSIUM 3.8             amLODIPine (NORVASC) 5 MG tablet [Pharmacy Med Name: AMLODIPINE BESYLATE 5 MG TAB] 90 tablet 3    Last Written Prescription Date:  01/26/2018 #90 x 3  Last filled 10/09/2018  Last office visit: 11/23/2018 JULIO Mclain   Future Office Visit: None    Sig: TAKE 1 TABLET(5 MG) BY MOUTH DAILY FOR BLOOD PRESSURE    Calcium Channel Blockers Protocol  Passed - 1/5/2019 12:31 AM       Passed - Blood pressure under 140/90 in past 12 months    BP Readings from Last 3 Encounters:   11/23/18 118/70   01/26/18 120/78   05/12/17 126/78                Passed - Recent (12 mo) or future (30 days) visit within the authorizing provider's specialty    " "Patient had office visit in the last 12 months or has a visit in the next 30 days with authorizing provider or within the authorizing provider's specialty.  See \"Patient Info\" tab in inbasket, or \"Choose Columns\" in Meds & Orders section of the refill encounter.             Passed - Medication is active on med list       Passed - Patient is age 18 or older       Passed - Normal serum creatinine on file in past 12 months    Recent Labs   Lab Test 11/23/18  1332   CR 0.90               "

## 2019-01-08 RX ORDER — LISINOPRIL 40 MG/1
40 TABLET ORAL DAILY
Qty: 90 TABLET | Refills: 3 | Status: SHIPPED | OUTPATIENT
Start: 2019-01-08 | End: 2020-03-24

## 2019-01-08 RX ORDER — AMLODIPINE BESYLATE 5 MG/1
TABLET ORAL
Qty: 90 TABLET | Refills: 3 | Status: SHIPPED | OUTPATIENT
Start: 2019-01-08 | End: 2020-03-24

## 2019-01-08 NOTE — TELEPHONE ENCOUNTER
Prescription approved per [facility] Refill Protocol.  Jyothi Hughes RN

## 2019-07-10 ENCOUNTER — OFFICE VISIT (OUTPATIENT)
Dept: FAMILY MEDICINE | Facility: CLINIC | Age: 49
End: 2019-07-10
Payer: COMMERCIAL

## 2019-07-10 VITALS
HEART RATE: 72 BPM | OXYGEN SATURATION: 99 % | WEIGHT: 176.6 LBS | RESPIRATION RATE: 15 BRPM | TEMPERATURE: 98.3 F | SYSTOLIC BLOOD PRESSURE: 118 MMHG | HEIGHT: 68 IN | DIASTOLIC BLOOD PRESSURE: 78 MMHG | BODY MASS INDEX: 26.76 KG/M2

## 2019-07-10 DIAGNOSIS — M54.41 RIGHT-SIDED LOW BACK PAIN WITH RIGHT-SIDED SCIATICA, UNSPECIFIED CHRONICITY: Primary | ICD-10-CM

## 2019-07-10 PROCEDURE — 99213 OFFICE O/P EST LOW 20 MIN: CPT | Performed by: NURSE PRACTITIONER

## 2019-07-10 RX ORDER — CYCLOBENZAPRINE HCL 10 MG
5-10 TABLET ORAL 3 TIMES DAILY PRN
Qty: 30 TABLET | Refills: 1 | Status: SHIPPED | OUTPATIENT
Start: 2019-07-10 | End: 2020-03-24

## 2019-07-10 RX ORDER — TRAZODONE HYDROCHLORIDE 50 MG/1
TABLET, FILM COATED ORAL AT BEDTIME
COMMUNITY
End: 2021-03-19

## 2019-07-10 RX ORDER — PREDNISONE 20 MG/1
40 TABLET ORAL DAILY
Qty: 10 TABLET | Refills: 0 | Status: SHIPPED | OUTPATIENT
Start: 2019-07-10 | End: 2019-07-15

## 2019-07-10 ASSESSMENT — MIFFLIN-ST. JEOR: SCORE: 1645.55

## 2019-07-10 NOTE — PROGRESS NOTES
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  He continues to have flares occasionally.  Usually they are treatable with ibuprofen and stretching but this is not helping now. He thinks that this started after playing Frisbee golf with his daughters last week.  Symptoms did seem to improve 3 days ago but then he went jogging and symptoms returned.  He has been using 600 mg of IB every 6 hours with no help.  Has not iced.        Accompanying Signs & Symptoms:  Risk of Fracture:  None  Risk of Cauda Equina:  None  Risk of Infection:  None  Risk of Cancer:  None  Risk of Ankylosing Spondylitis:  Onset at age <35, male, AND morning back stiffness. no     Patient Active Problem List   Diagnosis     CARDIOVASCULAR SCREENING; LDL GOAL LESS THAN 160     Hypertension, goal below 140/90     24 hour handout given      Family history of premature CAD     Alcohol abuse     History reviewed. No pertinent surgical history.    Social History     Tobacco Use     Smoking status: Never Smoker     Smokeless tobacco: Never Used   Substance Use Topics     Alcohol use: Yes     Comment: reports quit     Family History   Problem Relation Age of Onset     C.A.D. Father          at age 55,  massive MI,      Hypertension Brother      Diabetes Maternal Grandmother      Diabetes Maternal Grandfather      Diabetes Paternal Grandmother      Diabetes Paternal Grandfather      Psychotic Disorder Mother          Current Outpatient Medications   Medication Sig Dispense Refill     amLODIPine (NORVASC) 5 MG tablet TAKE 1 TABLET(5 MG) BY MOUTH DAILY FOR BLOOD PRESSURE 90 tablet 3     cyclobenzaprine (FLEXERIL) 10 MG tablet Take 0.5-1 tablets (5-10 mg) by mouth 3 times daily as needed for muscle spasms 30 tablet 1     lisinopril (PRINIVIL/ZESTRIL) 40 MG tablet TAKE 1 TABLET (40 MG) BY MOUTH DAILY 90 tablet 3     predniSONE (DELTASONE) 20 MG tablet Take 2 tablets (40 mg) by mouth daily for 5 days 10 tablet 0     traZODone (DESYREL) 50 MG tablet Take by mouth At Bedtime       finasteride (PROSCAR) 5 MG tablet Take 1/4 tablet daily. 30 tablet 3     Allergies   Allergen Reactions     No Clinical Screening - See Comments      Other reaction(s): Other - Describe In Comment Field  GINNEA PIGS  - trouble breathing, red itchy eyes  SCALLOPS - vomiting and diarrhea.     Penicillins Hives     Seafood      Scallop       Reviewed and updated as needed this visit by Provider  Tobacco  Allergies  Meds  Problems  Med Hx  Surg Hx  Fam Hx         Review of Systems   ROS COMP: CONSTITUTIONAL: NEGATIVE for fever, chills, change in weight  RESP: NEGATIVE for significant cough or SOB  CV: NEGATIVE for chest pain, palpitations or peripheral edema  MUSCULOSKELETAL: POSITIVE  for back pain low right back pain with no radiation or numbness  PSYCHIATRIC:  "NEGATIVE for changes in mood or affect  ROS otherwise negative      Objective    /78   Pulse 72   Temp 98.3  F (36.8  C) (Tympanic)   Resp 15   Ht 1.727 m (5' 8\")   Wt 80.1 kg (176 lb 9.6 oz)   SpO2 99%   BMI 26.85 kg/m    Body mass index is 26.85 kg/m .  Physical Exam   GENERAL: healthy, alert and no distress  RESP: lungs clear to auscultation - no rales, rhonchi or wheezes  CV: regular rate and rhythm, normal S1 S2, no S3 or S4, no murmur, click or rub, no peripheral edema and peripheral pulses strong  MS: no gross musculoskeletal defects noted, no edema  Comprehensive back pain exam:  Tenderness of low right buttock tenderness, Range of motion not limited by pain, Lower extremity strength functional and equal on both sides, Lower extremity reflexes within normal limits bilaterally, Lower extremity sensation normal and equal on both sides and Straight leg raise negative bilaterally  PSYCH: mentation appears normal, affect normal/bright    Diagnostic Test Results:  Labs reviewed in Epic        Assessment & Plan     1. Right-sided low back pain with right-sided sciatica, unspecified chronicity  Treating with prednisone for 5 days and flexeril as needed.  Order placed for Physical Therapy and patient can make appointment with them if symptoms are not improving.  Discussed signs and symptoms that he would need to be seen sooner, otherwise follow-up after a couple weeks of Physical Therapy if needed if symptoms are not improving.  - predniSONE (DELTASONE) 20 MG tablet; Take 2 tablets (40 mg) by mouth daily for 5 days  Dispense: 10 tablet; Refill: 0  - cyclobenzaprine (FLEXERIL) 10 MG tablet; Take 0.5-1 tablets (5-10 mg) by mouth 3 times daily as needed for muscle spasms  Dispense: 30 tablet; Refill: 1  - PHYSICAL THERAPY REFERRAL; Future    See Patient Instructions    Return in about 2 weeks (around 7/24/2019), or if symptoms worsen or fail to improve.    Isela Coe NP  [facility] " [facility]

## 2019-07-10 NOTE — PATIENT INSTRUCTIONS
1.  Information below on exercises and non-medication therapies.  You can also put lidocaine patches (Salon spas).  Apply in the evening and take off after 12 hours and shower in the morning.  2.  Make appointment with Physical Therapy if symptoms are not improving.  3.  Follow-up in clinic if any persistent symptoms despite treatment plan.  Sooner if worsening.    Exercises to Strengthen Your Lower Back  Strong lower back and abdominal muscles work together to support your spine. The exercises below will help strengthen the lower back. It is important that you begin exercising slowly and increase levels gradually.  Always begin any exercise program with stretching. If you feel pain while doing any of these exercises, stop and talk to your doctor about a more specific exercise program that better suits your condition.   Low back stretch  The point of stretching is to make you more flexible and increase your range of motion. Stretch only as much as you are able. Stretch slowly. Do not push your stretch to the limit. If at any point you feel pain while stretching, this is your (temporary) limit.    Lie on your back with your knees bent and both feet on the ground.    Slowly raise your left knee to your chest as you flatten your lower back against the floor. Hold for 5 seconds.    Relax and repeat the exercise with your right knee.    Do 10 of these exercises for each leg.    Repeat hugging both knees to your chest at the same time.  Building lower back strength  Start your exercise routine with 10 to 30 minutes a day, 1 to 3 times a day.  Initial exercises  Lying on your back:  1. Ankle pumps: Move your foot up and down, towards your head, and then away. Repeat 10 times with each foot.  2. Heel slides: Slowly bend your knee, drawing the heel of your foot towards you. Then slide your heel/foot from you, straightening your knee. Do not lift your foot off the floor (this is not a leg lift).  3. Abdominal contraction:  Bend your knees and put your hands on your stomach. Tighten your stomach muscles. Hold for 5 seconds, then relax. Repeat 10 times.  4. Straight leg raise: Bend one leg at the knee and keep the other leg straight. Tighten your stomach muscles. Slowly lift your straight leg 6 to 12 inches off the floor and hold for up to 5 seconds. Repeat 10 times on each side.  Standin. Wall squats: Stand with your back against the wall. Move your feet about 12 inches away from the wall. Tighten your stomach muscles, and slowly bend your knees until they are at about a 45 degree angle. Do not go down too far. Hold about 5 seconds. Then slowly return to your starting position. Repeat 10 times.  2. Heel raises: Stand facing the wall. Slowly raise the heels of your feet up and down, while keeping your toes on the floor. If you have trouble balancing, you can touch the wall with your hands. Repeat 10 times.  More advanced exercises  When you feel comfortable enough, try these exercises.  1. Kneeling lumbar extension: Begin on your hands and knees. At the same time, raise and straighten your right arm and left leg until they are parallel to the ground. Hold for 2 seconds and come back slowly to a starting position. Repeat with left arm and right leg, alternating 10 times.  2. Prone lumbar extension: Lie face down, arms extended overhead, palms on the floor. At the same time, raise your right arm and left leg as high as comfortably possible. Hold for 10 seconds and slowly return to start. Repeat with left arm and right leg, alternating 10 times. Gradually build up to 20 times. (Advanced: Repeat this exercise raising both arms and both legs a few inches off the floor at the same time. Hold for 5 seconds and release.)  3. Pelvic tilt: Lie on the floor on your back with your knees bent at 90 degrees. Your feet should be flat on the floor. Inhale, exhale, then slowly contract your abdominal muscles bringing your navel toward your spine.  Let your pelvis rock back until your lower back is flat on the floor. Hold for 10 seconds while breathing smoothly.  4. Abdominal crunch: Perform a pelvic tilt (above) flattening your lower back against the floor. Holding the tension in your abdominal muscles, take another breath and raise your shoulder blades off the ground (this is not a full sit-up). Keep your head in line with your body (don t bend your neck forward). Hold for 2 seconds, then slowly lower.  Date Last Reviewed: 6/1/2016 2000-2018 [employer]. [address] [place] [address]. All rights reserved. This information is not intended as a substitute for professional medical care. Always follow your healthcare professional's instructions.           Patient Education     Relieving Back Pain  Back pain is a common problem. You can strain back muscles by lifting too much weight or just by moving the wrong way. Back strain can be uncomfortable, even painful. And it can take weeks or months to improve. To help yourself feel better and prevent future back strains, try these tips.  Important: Don't give aspirin to children or teens without first discussing it with your child's healthcare provider.  Ice    Ice reduces muscle pain and swelling. It helps most during the first 24 to 48 hours after an injury.    Wrap an ice pack or a bag of frozen peas in a thin towel. Never put ice directly on your skin.    Place the ice where your back hurts the most.    Don t ice for more than 20 minutes at a time.    You can use ice several times a day.  Medicines  Over-the-counter pain relievers include acetaminophen and anti-inflammatory medicines, which includes aspirin, naproxen, or ibuprofen. They can help ease discomfort. Some also reduce swelling.    Tell your healthcare provider about any medicines you are already taking.    Take medicines only as directed.  Manipulation and massage  Having manipulation by an osteopathic doctor or chiropractor  may be helpful. Getting a massage also may help.   Heat  After the first 48 hours, heat can relax sore muscles and improve blood flow.    Try a warm bath or shower. Or use a heating pad set on low. To prevent a burn, keep a cloth between you and the heating pad.    Don t use a heating pad for more than 15 minutes at a time. Never sleep on a heating pad.  Date Last Reviewed: 6/1/2018 2000-2018 [employer]. [address] [place] [address], [place] [address]. All rights reserved. This information is not intended as a substitute for professional medical care. Always follow your healthcare professional's instructions.

## 2019-09-28 ENCOUNTER — HEALTH MAINTENANCE LETTER (OUTPATIENT)
Age: 49
End: 2019-09-28

## 2019-12-30 DIAGNOSIS — L65.9 HAIR LOSS: ICD-10-CM

## 2019-12-30 NOTE — TELEPHONE ENCOUNTER
Requested Prescriptions   Pending Prescriptions Disp Refills     finasteride (PROSCAR) 5 MG tablet [Pharmacy Med Name: FINASTERIDE 5 MG TABLET] 23 tablet 5     Sig: TAKE 1/4 TABLET DAILY.  Last Written Prescription Date:  12/04/2019 #30 x 3  Last filled 10/02/2019   Last office visit: 7/10/2019 JULIO Coe   Future Office Visit:  None         There is no refill protocol information for this order

## 2019-12-31 RX ORDER — FINASTERIDE 5 MG/1
TABLET, FILM COATED ORAL
Qty: 23 TABLET | Refills: 5 | Status: SHIPPED | OUTPATIENT
Start: 2019-12-31 | End: 2020-03-24

## 2020-03-24 ENCOUNTER — TELEPHONE (OUTPATIENT)
Dept: FAMILY MEDICINE | Facility: CLINIC | Age: 50
End: 2020-03-24

## 2020-03-24 ENCOUNTER — VIRTUAL VISIT (OUTPATIENT)
Dept: FAMILY MEDICINE | Facility: CLINIC | Age: 50
End: 2020-03-24
Payer: COMMERCIAL

## 2020-03-24 DIAGNOSIS — L65.9 HAIR LOSS: ICD-10-CM

## 2020-03-24 DIAGNOSIS — A69.20 LYME DISEASE: ICD-10-CM

## 2020-03-24 DIAGNOSIS — I10 HYPERTENSION, GOAL BELOW 140/90: ICD-10-CM

## 2020-03-24 PROCEDURE — 99442 ZZC PHYSICIAN TELEPHONE EVALUATION 11-20 MIN: CPT | Performed by: FAMILY MEDICINE

## 2020-03-24 RX ORDER — FINASTERIDE 5 MG/1
TABLET, FILM COATED ORAL
Qty: 23 TABLET | Refills: 5 | Status: SHIPPED | OUTPATIENT
Start: 2020-03-24 | End: 2021-03-19

## 2020-03-24 RX ORDER — AMLODIPINE BESYLATE 5 MG/1
TABLET ORAL
Qty: 90 TABLET | Refills: 3 | Status: SHIPPED | OUTPATIENT
Start: 2020-03-24 | End: 2020-03-24

## 2020-03-24 RX ORDER — AMLODIPINE BESYLATE 5 MG/1
5 TABLET ORAL DAILY
Qty: 90 TABLET | Refills: 3 | Status: SHIPPED | OUTPATIENT
Start: 2020-03-24 | End: 2021-03-19

## 2020-03-24 RX ORDER — LISINOPRIL 40 MG/1
40 TABLET ORAL DAILY
Qty: 90 TABLET | Refills: 3 | Status: SHIPPED | OUTPATIENT
Start: 2020-03-24 | End: 2021-03-19

## 2020-03-24 NOTE — TELEPHONE ENCOUNTER
Received a fax from [facility] Re: Amlodipine 5mg    Sig Clarification

## 2020-03-24 NOTE — PROGRESS NOTES
"Mani Vu is a 50 year old male who is being evaluated via a billable telephone visit.      The patient has been notified of following:     \"This telephone visit will be conducted via a call between you and your physician/provider. We have found that certain health care needs can be provided without the need for a physical exam.  This service lets us provide the care you need with a short phone conversation.  If a prescription is necessary we can send it directly to your pharmacy.  If lab work is needed we can place an order for that and you can then stop by our lab to have the test done at a later time.    If during the course of the call the physician/provider feels a telephone visit is not appropriate, you will not be charged for this service.\"     Mani Vu complains of    Chief Complaint   Patient presents with     Telephone     BP       I have reviewed and updated the patient's Past Medical History, Social History, Family History and Medication List.    ALLERGIES  No clinical screening - see comments; Penicillins; and Seafood    Hypertension Follow-up  Amlodipine 5mg every day, lisinopril 40mg qd    Do you check your blood pressure regularly outside of the clinic? Yes     Are you following a low salt diet? No    Are your blood pressures ever more than 140 on the top number (systolic) OR more   than 90 on the bottom number (diastolic), for example 140/90? Yes    Medication Followup of hair loss  Proscar 1.25mg qd    Taking Medication as prescribed: yes    Side Effects:  None    Medication Helping Symptoms:  yes             Assessment/Plan:  1. Hypertension, goal below 140/90  Within guidelines.  Maintaining a healthy lifestyle.  Denies side effects of the medication.  He states he has been compliant with his medication also.  Normal renal function in the past.  He reports home blood pressure readings generally 120/70.  - amLODIPine (NORVASC) 5 MG tablet; For blood pressure.  Dispense: 90 " tablet; Refill: 3  - lisinopril (ZESTRIL) 40 MG tablet; Take 1 tablet (40 mg) by mouth daily For blood pressure.  Dispense: 90 tablet; Refill: 3  We will continue.  Refill sent for 1 year.    2. Hair loss  Denies side effects.  - finasteride (PROSCAR) 5 MG tablet; Take 1/4 tablet po daily.  Dispense: 23 tablet; Refill: 5  We will continue, 1 year refill.    3. Lyme disease  He presented with a bull's-eye rash last fall, 2019.  Treated with antibiotics and symptoms have resolved.  He denies any residual symptoms.      Phone call duration:  12 minutes    Shania Mclain MD

## 2020-05-22 ENCOUNTER — PATIENT OUTREACH (OUTPATIENT)
Dept: CARE COORDINATION | Facility: CLINIC | Age: 50
End: 2020-05-22

## 2020-05-22 DIAGNOSIS — F10.10 ALCOHOL ABUSE: Primary | ICD-10-CM

## 2020-05-22 NOTE — LETTER
[facility] CARE COORDINATION  [address]   [place] [address]    May 27, 2020    Mani Vu  [address] [facility] [address]      Dear Mani,    I am a clinic care coordinator who works with Shania Mclain MD at [facility]. I have been trying to reach you recently to introduce Clinic Care Coordination and to see if there was anything I could assist you with.  Below is a description of clinic care coordination and how I can further assist you.      The clinic care coordination team is made up of a registered nurse,  and community health worker who understand the health care system. The goal of clinic care coordination is to help you manage your health and improve access to the health care system in the most efficient manner. The team can assist you in meeting your health care goals by providing education, coordinating services, strengthening the communication among your providers and supporting you with any resource needs.    Please feel free to contact me at [phone] with any questions or concerns. We are focused on providing you with the highest-quality healthcare experience possible and that all starts with you.     Sincerely,     KAIT Felipe     [facility] Care Coordination  [facility]  Tel: [phone]

## 2020-05-22 NOTE — PROGRESS NOTES
Clinic Care Coordination Contact  [facility]/Voicemail       Clinical Data: Care Coordinator Outreach  Outreach attempted x 1.  Left message on patient's voicemail with call back information and requested return call.  Plan:  Care Coordinator will try to reach patient again in 1-2 business days.    Aixa Hernandez [facility] Care Coordination  [facility]  Tel: [phone]

## 2020-05-22 NOTE — PROGRESS NOTES
Clinical Product Navigator reviewed chart; patient on payer product coverage.  Review results: Met referral criteria for Care Coordinator; referral to be sent.    Jsosie Walden  Clinical Product Navigator

## 2020-05-27 NOTE — PROGRESS NOTES
Clinic Care Coordination Contact  [facility]/Voicemail       Clinical Data: Care Coordinator Outreach  Outreach attempted x 2.  Left message on patient's voicemail with call back information and requested return call.  Plan: Care Coordinator sent care coordination introduction letter via [employer]. Care Coordinator will do no further outreaches at this time.    KAIT Felipe     [facility] Care Coordination  [facility]  Tel: [phone]

## 2021-01-05 ENCOUNTER — PATIENT OUTREACH (OUTPATIENT)
Dept: CARE COORDINATION | Facility: CLINIC | Age: 51
End: 2021-01-05

## 2021-01-05 DIAGNOSIS — F10.10 ALCOHOL ABUSE: Primary | ICD-10-CM

## 2021-01-05 NOTE — PROGRESS NOTES
Clinical Product Navigator RN reviewed chart; patient on payer product coverage.      Review results: Met referral criteria for Care Coordinator; referral to be sent.    Michelle Reyes RN/Clinical Product Navigator

## 2021-01-06 ENCOUNTER — PATIENT OUTREACH (OUTPATIENT)
Dept: CARE COORDINATION | Facility: CLINIC | Age: 51
End: 2021-01-06

## 2021-01-06 NOTE — PROGRESS NOTES
Clinic Care Coordination Contact  [facility]/Voicemail       Clinical Data: Care Coordinator Outreach.  Referral from clinic product navigator.  Patient at [facility] ED 12/18/2020 for alcohol use.  Per this note, Patient reported he is drinking too much, for past 7 days is drinking at least a L.  He was sober for 7 months prior to this, only interested in detox not treatment    Outreach attempted x 1.  Left message on patient's voicemail with call back information and requested return call.  Plan: Care Coordinator will try to reach patient again in 1-2 business days.    Lauren Smith, KAIT, MSW   [facility]  Care Coordination  [facility]  [phone]  1/6/2021 1:04 PM

## 2021-01-06 NOTE — LETTER
[facility] CARE COORDINATION  [address]  [place] [address]    January 7, 2021    Mani Vu  [address]  [place] [facility] [address]      Dear Mani,     I am a clinic care coordinator who works with Shania Mclain MD at [facility]. Below is a description of clinic care coordination and how I can further assist you.      The clinic care coordination team is made up of a registered nurse,  and community health worker who understand the health care system. The goal of clinic care coordination is to help you manage your health and improve access to the health care system in the most efficient manner. The team can assist you in meeting your health care goals by providing education, coordinating services, strengthening the communication among your providers and supporting you with any resource needs.    Please feel free to contact me at [phone] with any questions or concerns. We are focused on providing you with the highest-quality healthcare experience possible and that all starts with you.     Sincerely,      Lauren Smith, KAIT, MSW Clinic   [facility]  Care Coordination  [facility] [place] [facility]   [email or web address]  Office: [phone]  Employed by [employer]

## 2021-01-07 NOTE — PROGRESS NOTES
Clinic Care Coordination Contact  [facility]/Voicemail       Clinical Data: Care Coordinator Outreach  Outreach attempted x 2.  Left message on patient's voicemail with call back information and requested return call.  Plan: Care Coordinator sent care coordination introduction letter today via [employer]. Care Coordinator will do no further outreaches at this time.    KAIT Francis, MSW   [facility]  Care Coordination  [facility]  [phone]  1/7/2021 12:02 PM

## 2021-01-09 ENCOUNTER — HEALTH MAINTENANCE LETTER (OUTPATIENT)
Age: 51
End: 2021-01-09

## 2021-03-12 ASSESSMENT — ENCOUNTER SYMPTOMS
HEMATOCHEZIA: 0
DIZZINESS: 0
MYALGIAS: 0
PARESTHESIAS: 0
HEMATURIA: 0
NERVOUS/ANXIOUS: 1
CONSTIPATION: 0
FREQUENCY: 0
ARTHRALGIAS: 0
DIARRHEA: 0
HEADACHES: 0
ABDOMINAL PAIN: 0
COUGH: 0
HEARTBURN: 0
EYE PAIN: 0
SHORTNESS OF BREATH: 0
DYSURIA: 0
NAUSEA: 0
JOINT SWELLING: 0
FEVER: 0
CHILLS: 0
SORE THROAT: 0
PALPITATIONS: 0
WEAKNESS: 0

## 2021-03-19 ENCOUNTER — OFFICE VISIT (OUTPATIENT)
Dept: FAMILY MEDICINE | Facility: CLINIC | Age: 51
End: 2021-03-19
Payer: COMMERCIAL

## 2021-03-19 ENCOUNTER — TELEPHONE (OUTPATIENT)
Dept: FAMILY MEDICINE | Facility: CLINIC | Age: 51
End: 2021-03-19

## 2021-03-19 VITALS
OXYGEN SATURATION: 100 % | TEMPERATURE: 97.8 F | HEIGHT: 68 IN | RESPIRATION RATE: 20 BRPM | SYSTOLIC BLOOD PRESSURE: 126 MMHG | BODY MASS INDEX: 26.55 KG/M2 | HEART RATE: 67 BPM | WEIGHT: 175.2 LBS | DIASTOLIC BLOOD PRESSURE: 80 MMHG

## 2021-03-19 DIAGNOSIS — Z12.11 SCREEN FOR COLON CANCER: ICD-10-CM

## 2021-03-19 DIAGNOSIS — R73.09 ABNORMAL GLUCOSE: ICD-10-CM

## 2021-03-19 DIAGNOSIS — Z12.5 SCREENING FOR PROSTATE CANCER: ICD-10-CM

## 2021-03-19 DIAGNOSIS — I10 HYPERTENSION, GOAL BELOW 140/90: ICD-10-CM

## 2021-03-19 DIAGNOSIS — Z13.6 CARDIOVASCULAR SCREENING; LDL GOAL LESS THAN 160: ICD-10-CM

## 2021-03-19 DIAGNOSIS — L65.9 HAIR LOSS: ICD-10-CM

## 2021-03-19 DIAGNOSIS — Z11.59 ENCOUNTER FOR SCREENING FOR OTHER VIRAL DISEASES: Primary | ICD-10-CM

## 2021-03-19 DIAGNOSIS — Z11.59 ENCOUNTER FOR HEPATITIS C SCREENING TEST FOR LOW RISK PATIENT: ICD-10-CM

## 2021-03-19 DIAGNOSIS — F10.10 ALCOHOL ABUSE: ICD-10-CM

## 2021-03-19 DIAGNOSIS — Z00.00 ROUTINE HISTORY AND PHYSICAL EXAMINATION OF ADULT: Primary | ICD-10-CM

## 2021-03-19 DIAGNOSIS — A69.20 LYME DISEASE: ICD-10-CM

## 2021-03-19 LAB
CHOLEST SERPL-MCNC: 249 MG/DL
CREAT UR-MCNC: 62 MG/DL
HBA1C MFR BLD: 5.4 % (ref 0–5.6)
HCV AB SERPL QL IA: NONREACTIVE
HDLC SERPL-MCNC: 75 MG/DL
LDLC SERPL CALC-MCNC: 159 MG/DL
MICROALBUMIN UR-MCNC: 6 MG/L
MICROALBUMIN/CREAT UR: 10.47 MG/G CR (ref 0–17)
NONHDLC SERPL-MCNC: 174 MG/DL
PSA SERPL-ACNC: 1.57 UG/L (ref 0–4)
TRIGL SERPL-MCNC: 76 MG/DL

## 2021-03-19 PROCEDURE — 86803 HEPATITIS C AB TEST: CPT | Performed by: FAMILY MEDICINE

## 2021-03-19 PROCEDURE — 36415 COLL VENOUS BLD VENIPUNCTURE: CPT | Performed by: FAMILY MEDICINE

## 2021-03-19 PROCEDURE — 99213 OFFICE O/P EST LOW 20 MIN: CPT | Mod: 25 | Performed by: FAMILY MEDICINE

## 2021-03-19 PROCEDURE — 83036 HEMOGLOBIN GLYCOSYLATED A1C: CPT | Performed by: FAMILY MEDICINE

## 2021-03-19 PROCEDURE — 82043 UR ALBUMIN QUANTITATIVE: CPT | Performed by: FAMILY MEDICINE

## 2021-03-19 PROCEDURE — G0103 PSA SCREENING: HCPCS | Performed by: FAMILY MEDICINE

## 2021-03-19 PROCEDURE — 80061 LIPID PANEL: CPT | Performed by: FAMILY MEDICINE

## 2021-03-19 PROCEDURE — 99396 PREV VISIT EST AGE 40-64: CPT | Performed by: FAMILY MEDICINE

## 2021-03-19 RX ORDER — FINASTERIDE 5 MG/1
5 TABLET, FILM COATED ORAL DAILY
Qty: 90 TABLET | Refills: 3 | Status: SHIPPED | OUTPATIENT
Start: 2021-03-19 | End: 2021-03-19

## 2021-03-19 RX ORDER — FINASTERIDE 5 MG/1
5 TABLET, FILM COATED ORAL DAILY
Qty: 90 TABLET | Refills: 3 | Status: SHIPPED | OUTPATIENT
Start: 2021-03-19 | End: 2022-06-11

## 2021-03-19 RX ORDER — FINASTERIDE 5 MG/1
5 TABLET, FILM COATED ORAL DAILY
Qty: 90 TABLET | Refills: 3 | Status: CANCELLED | OUTPATIENT
Start: 2021-03-19

## 2021-03-19 RX ORDER — LISINOPRIL 40 MG/1
40 TABLET ORAL DAILY
Qty: 90 TABLET | Refills: 3 | Status: SHIPPED | OUTPATIENT
Start: 2021-03-19 | End: 2022-03-09

## 2021-03-19 RX ORDER — AMLODIPINE BESYLATE 5 MG/1
5 TABLET ORAL DAILY
Qty: 90 TABLET | Refills: 3 | Status: SHIPPED | OUTPATIENT
Start: 2021-03-19 | End: 2022-03-15

## 2021-03-19 RX ORDER — LISINOPRIL 40 MG/1
40 TABLET ORAL DAILY
Qty: 90 TABLET | Refills: 3 | OUTPATIENT
Start: 2021-03-19

## 2021-03-19 RX ORDER — AMLODIPINE BESYLATE 5 MG/1
5 TABLET ORAL DAILY
Qty: 90 TABLET | Refills: 3 | Status: SHIPPED | OUTPATIENT
Start: 2021-03-19 | End: 2021-03-19

## 2021-03-19 RX ORDER — AMLODIPINE BESYLATE 5 MG/1
TABLET ORAL
Qty: 90 TABLET | Refills: 3 | OUTPATIENT
Start: 2021-03-19

## 2021-03-19 ASSESSMENT — MIFFLIN-ST. JEOR: SCORE: 1623.45

## 2021-03-19 ASSESSMENT — ENCOUNTER SYMPTOMS
DIARRHEA: 0
CONSTIPATION: 0
WEAKNESS: 0
DYSURIA: 0
HEARTBURN: 0
HEMATOCHEZIA: 0
PARESTHESIAS: 0
HEMATURIA: 0
NERVOUS/ANXIOUS: 1
ARTHRALGIAS: 0
SORE THROAT: 0
JOINT SWELLING: 0
FREQUENCY: 0
DIZZINESS: 0
COUGH: 0
CHILLS: 0
SHORTNESS OF BREATH: 0
NAUSEA: 0
ABDOMINAL PAIN: 0
FEVER: 0
MYALGIAS: 0
EYE PAIN: 0
PALPITATIONS: 0
HEADACHES: 0
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
## 2021-03-19 NOTE — TELEPHONE ENCOUNTER
90 tabs with 3 refills of all 3 meds [facility]   [facility] should [facility] these refills and pull them over?    [facility] [address] to be sure the Rx's were received.  Amlodipine, proscar, lisinopril.      They have all 3 and will put them on hold so the other [facility] can see them and fill.    Attempted to call patient at home/mobile number, no answer, left message on voicemail; patient was advised Rx's can be filled at desired [facility], simply call them to pull the Rx's over, he was instructed to return call to [facility] at [phone] if he has further concerns.   Will leave open in case of call back in the next few days.    Taty Milian RN  [facility]
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
## 2021-03-19 NOTE — PATIENT INSTRUCTIONS
Preventive Health Recommendations  Male Ages 50 - 64    *    Check with any pharmacy about the new shingles.     *   Blood tests today.     *   Our scheduling center will call you about setting up a colonoscopy.     *    Yearly physical.     *    You can take a full tablet of the finasteride this may help the night time urination.     *    No change in blood pressure medications.     Yearly exam:             See your health care provider every year in order to  o   Review health changes.   o   Discuss preventive care.    o   Review your medicines if your doctor has prescribed any.     Have a cholesterol test every 5 years, or more frequently if you are at risk for high cholesterol/heart disease.     Have a diabetes test (fasting glucose) every three years. If you are at risk for diabetes, you should have this test more often.     Have a colonoscopy at age 50, or have a yearly FIT test (stool test). These exams will check for colon cancer.      Talk with your health care provider about whether or not a prostate cancer screening test (PSA) is right for you.    You should be tested each year for STDs (sexually transmitted diseases), if you re at risk.     Shots: Get a flu shot each year. Get a tetanus shot every 10 years.     Nutrition:    Eat at least 5 servings of fruits and vegetables daily.     Eat whole-grain bread, whole-wheat pasta and brown rice instead of white grains and rice.     Get adequate Calcium and Vitamin D.     Lifestyle    Exercise for at least 150 minutes a week (30 minutes a day, 5 days a week). This will help you control your weight and prevent disease.     Limit alcohol to one drink per day.     No smoking.     Wear sunscreen to prevent skin cancer.     See your dentist every six months for an exam and cleaning.     See your eye doctor every 1 to 2 years.

## 2021-03-30 DIAGNOSIS — Z11.59 ENCOUNTER FOR SCREENING FOR OTHER VIRAL DISEASES: ICD-10-CM

## 2021-03-30 LAB
SARS-COV-2 RNA RESP QL NAA+PROBE: NORMAL
SPECIMEN SOURCE: NORMAL

## 2021-03-30 PROCEDURE — U0005 INFEC AGEN DETEC AMPLI PROBE: HCPCS | Performed by: SURGERY

## 2021-03-30 PROCEDURE — U0003 INFECTIOUS AGENT DETECTION BY NUCLEIC ACID (DNA OR RNA); SEVERE ACUTE RESPIRATORY SYNDROME CORONAVIRUS 2 (SARS-COV-2) (CORONAVIRUS DISEASE [COVID-19]), AMPLIFIED PROBE TECHNIQUE, MAKING USE OF HIGH THROUGHPUT TECHNOLOGIES AS DESCRIBED BY CMS-2020-01-R: HCPCS | Performed by: SURGERY

## 2021-03-31 LAB
LABORATORY COMMENT REPORT: NORMAL
SARS-COV-2 RNA RESP QL NAA+PROBE: NEGATIVE
SPECIMEN SOURCE: NORMAL

## 2021-03-31 RX ORDER — TRAZODONE HYDROCHLORIDE 100 MG/1
300 TABLET ORAL
COMMUNITY

## 2021-04-01 ENCOUNTER — ANESTHESIA EVENT (OUTPATIENT)
Dept: GASTROENTEROLOGY | Facility: CLINIC | Age: 51
End: 2021-04-01
Payer: COMMERCIAL

## 2021-04-01 NOTE — ANESTHESIA PREPROCEDURE EVALUATION
Anesthesia Pre-Procedure Evaluation    Patient: Mani Vu   MRN: [id number] : 1970        Preoperative Diagnosis: Screen for colon cancer [Z12.11]   Procedure : Procedure(s):  COLONOSCOPY     Past Medical History:   Diagnosis Date     Alcohol abuse 2018    May 2, 2018       No past surgical history on file.   Allergies   Allergen Reactions     No Clinical Screening - See Comments      Other reaction(s): Other - Describe In Comment Field  GINNEA PIGS  - trouble breathing, red itchy eyes  SCALLOPS - vomiting and diarrhea.     Seafood      Scallop      Social History     Tobacco Use     Smoking status: Never Smoker     Smokeless tobacco: Never Used   Substance Use Topics     Alcohol use: Yes     Comment: reports quit      Wt Readings from Last 1 Encounters:   21 79.5 kg (175 lb 3.2 oz)        Anesthesia Evaluation   Pt has not had prior anesthetic         ROS/MED HX  ENT/Pulmonary:       Neurologic: Comment: Lyme disease - neg neurologic ROS     Cardiovascular:     (+) hypertension-----    METS/Exercise Tolerance:     Hematologic:  - neg hematologic  ROS     Musculoskeletal:  - neg musculoskeletal ROS     GI/Hepatic: Comment: Alcohol abuse - neg GI/hepatic ROS     Renal/Genitourinary:  - neg Renal ROS     Endo:  - neg endo ROS     Psychiatric/Substance Use:  - neg psychiatric ROS     Infectious Disease:  - neg infectious disease ROS     Malignancy:  - neg malignancy ROS     Other:  - neg other ROS          Physical Exam    Airway        Mallampati: II   TM distance: > 3 FB   Neck ROM: full   Mouth opening: > 3 cm    Respiratory Devices and Support         Dental  no notable dental history         Cardiovascular   cardiovascular exam normal          Pulmonary   pulmonary exam normal                OUTSIDE LABS:  CBC: No results found for: WBC, HGB, HCT, PLT  BMP:   Lab Results   Component Value Date     2018     2017    POTASSIUM 3.8 2018    POTASSIUM 3.9  05/12/2017    CHLORIDE 102 11/23/2018    CHLORIDE 102 05/12/2017    CO2 29 11/23/2018    CO2 28 05/12/2017    BUN 19 11/23/2018    BUN 15 05/12/2017    CR 0.90 11/23/2018    CR 0.91 05/12/2017    GLC 92 11/23/2018    GLC 84 05/12/2017     COAGS: No results found for: PTT, INR, FIBR  POC: No results found for: BGM, HCG, HCGS  HEPATIC:   Lab Results   Component Value Date    ALT 50 08/19/2015    AST 58 08/19/2015     OTHER:   Lab Results   Component Value Date    A1C 5.4 03/19/2021    TRISH 9.5 11/23/2018       Anesthesia Plan    ASA Status:  2      Anesthesia Type: MAC.              Consents    Anesthesia Plan(s) and associated risks, benefits, and realistic alternatives discussed. Questions answered and patient/representative(s) expressed understanding.     - Discussed with:  Patient         Postoperative Care    Pain management: IV analgesics.   PONV prophylaxis: Ondansetron (or other 5HT-3), Dexamethasone or Solumedrol     Comments:                Marito Adam CRNA, APRN CRNA

## 2021-04-02 ENCOUNTER — HOSPITAL ENCOUNTER (OUTPATIENT)
Facility: CLINIC | Age: 51
Discharge: HOME | End: 2021-04-02
Attending: SURGERY | Admitting: SURGERY
Payer: COMMERCIAL

## 2021-04-02 ENCOUNTER — ANESTHESIA (OUTPATIENT)
Dept: GASTROENTEROLOGY | Facility: CLINIC | Age: 51
End: 2021-04-02
Payer: COMMERCIAL

## 2021-04-02 VITALS
RESPIRATION RATE: 14 BRPM | SYSTOLIC BLOOD PRESSURE: 106 MMHG | HEART RATE: 85 BPM | TEMPERATURE: 97.9 F | BODY MASS INDEX: 26.52 KG/M2 | DIASTOLIC BLOOD PRESSURE: 86 MMHG | WEIGHT: 175 LBS | HEIGHT: 68 IN | OXYGEN SATURATION: 99 %

## 2021-04-02 LAB — COLONOSCOPY: NORMAL

## 2021-04-02 PROCEDURE — 370N000017 HC ANESTHESIA TECHNICAL FEE, PER MIN: Performed by: SURGERY

## 2021-04-02 PROCEDURE — 250N000009 HC RX 250: Performed by: SURGERY

## 2021-04-02 PROCEDURE — 250N000009 HC RX 250: Performed by: NURSE ANESTHETIST, CERTIFIED REGISTERED

## 2021-04-02 PROCEDURE — 258N000003 HC RX IP 258 OP 636: Performed by: SURGERY

## 2021-04-02 PROCEDURE — G0121 COLON CA SCRN NOT HI RSK IND: HCPCS | Performed by: SURGERY

## 2021-04-02 PROCEDURE — 45378 DIAGNOSTIC COLONOSCOPY: CPT | Performed by: SURGERY

## 2021-04-02 PROCEDURE — 250N000011 HC RX IP 250 OP 636: Performed by: NURSE ANESTHETIST, CERTIFIED REGISTERED

## 2021-04-02 RX ORDER — NALOXONE HYDROCHLORIDE 0.4 MG/ML
0.2 INJECTION, SOLUTION INTRAMUSCULAR; INTRAVENOUS; SUBCUTANEOUS
Status: DISCONTINUED | OUTPATIENT
Start: 2021-04-02 | End: 2021-04-02 | Stop reason: HOSPADM

## 2021-04-02 RX ORDER — ONDANSETRON 2 MG/ML
4 INJECTION INTRAMUSCULAR; INTRAVENOUS
Status: DISCONTINUED | OUTPATIENT
Start: 2021-04-02 | End: 2021-04-02 | Stop reason: HOSPADM

## 2021-04-02 RX ORDER — PROPOFOL 10 MG/ML
INJECTION, EMULSION INTRAVENOUS CONTINUOUS PRN
Status: DISCONTINUED | OUTPATIENT
Start: 2021-04-02 | End: 2021-04-02

## 2021-04-02 RX ORDER — PROPOFOL 10 MG/ML
INJECTION, EMULSION INTRAVENOUS PRN
Status: DISCONTINUED | OUTPATIENT
Start: 2021-04-02 | End: 2021-04-02

## 2021-04-02 RX ORDER — NALOXONE HYDROCHLORIDE 0.4 MG/ML
0.4 INJECTION, SOLUTION INTRAMUSCULAR; INTRAVENOUS; SUBCUTANEOUS
Status: DISCONTINUED | OUTPATIENT
Start: 2021-04-02 | End: 2021-04-02 | Stop reason: HOSPADM

## 2021-04-02 RX ORDER — FLUMAZENIL 0.1 MG/ML
0.2 INJECTION, SOLUTION INTRAVENOUS
Status: DISCONTINUED | OUTPATIENT
Start: 2021-04-02 | End: 2021-04-02 | Stop reason: HOSPADM

## 2021-04-02 RX ORDER — LIDOCAINE 40 MG/G
CREAM TOPICAL
Status: DISCONTINUED | OUTPATIENT
Start: 2021-04-02 | End: 2021-04-02 | Stop reason: HOSPADM

## 2021-04-02 RX ORDER — SODIUM CHLORIDE, SODIUM LACTATE, POTASSIUM CHLORIDE, CALCIUM CHLORIDE 600; 310; 30; 20 MG/100ML; MG/100ML; MG/100ML; MG/100ML
INJECTION, SOLUTION INTRAVENOUS CONTINUOUS
Status: DISCONTINUED | OUTPATIENT
Start: 2021-04-02 | End: 2021-04-02 | Stop reason: HOSPADM

## 2021-04-02 RX ADMIN — PROPOFOL 200 MCG/KG/MIN: 10 INJECTION, EMULSION INTRAVENOUS at 11:22

## 2021-04-02 RX ADMIN — LIDOCAINE HYDROCHLORIDE 1 ML: 10 INJECTION, SOLUTION EPIDURAL; INFILTRATION; INTRACAUDAL; PERINEURAL at 11:04

## 2021-04-02 RX ADMIN — SODIUM CHLORIDE, POTASSIUM CHLORIDE, SODIUM LACTATE AND CALCIUM CHLORIDE: 600; 310; 30; 20 INJECTION, SOLUTION INTRAVENOUS at 11:04

## 2021-04-02 RX ADMIN — LIDOCAINE HYDROCHLORIDE 5 ML: 10 INJECTION, SOLUTION EPIDURAL; INFILTRATION; INTRACAUDAL; PERINEURAL at 11:24

## 2021-04-02 RX ADMIN — PROPOFOL 100 MG: 10 INJECTION, EMULSION INTRAVENOUS at 11:24

## 2021-04-02 ASSESSMENT — MIFFLIN-ST. JEOR: SCORE: 1622.58

## 2021-04-02 NOTE — ANESTHESIA CARE TRANSFER NOTE
Patient: Mani Vu    Procedure(s):  COLONOSCOPY    Diagnosis: Screen for colon cancer [Z12.11]  Diagnosis Additional Information: No value filed.    Anesthesia Type:   MAC     Note:      Level of Consciousness: awake  Oxygen Supplementation: room air    Independent Airway: airway patency satisfactory and stable        Patient transferred to: Phase II    Handoff Report: Identifed the Patient, Identified the Reponsible Provider, Reviewed the pertinent medical history, Discussed the surgical course, Reviewed Intra-OP anesthesia mangement and issues during anesthesia, Set expectations for post-procedure period and Allowed opportunity for questions and acknowledgement of understanding      Vitals: (Last set prior to Anesthesia Care Transfer)  CRNA VITALS  4/2/2021 1107 - 4/2/2021 1137      4/2/2021             Pulse:  79    Ht Rate:  79    SpO2:  99 %        Electronically Signed By: AMERICA Nichols CRNA  April 2, 2021  11:37 AM  
Chloraprep

## 2021-04-02 NOTE — ANESTHESIA POSTPROCEDURE EVALUATION
Patient: Mani Vu    Procedure(s):  COLONOSCOPY    Diagnosis:Screen for colon cancer [Z12.11]  Diagnosis Additional Information: No value filed.    Anesthesia Type:  MAC    Note:  Disposition: Outpatient   Postop Pain Control:    PONV: No   Neuro/Psych: Uneventful            Sign Out: Acceptable/Baseline neuro status   Airway/Respiratory: Uneventful            Sign Out: Acceptable/Baseline resp. status   CV/Hemodynamics: Uneventful            Sign Out: Acceptable CV status   Other NRE: NONE   DID A NON-ROUTINE EVENT OCCUR? No         Last vitals:  Vitals:    04/02/21 1027   BP: 129/89   Pulse: 73   Resp: 16   Temp: 36.6  C (97.9  F)   SpO2: 100%       Last vitals prior to Anesthesia Care Transfer:  CRNA VITALS  4/2/2021 1107 - 4/2/2021 1138      4/2/2021             Pulse:  79    Ht Rate:  79    SpO2:  99 %          Electronically Signed By: AMERICA Nichols CRNA  April 2, 2021  11:38 AM

## 2021-04-02 NOTE — H&P
"51 year old year old male here for colonoscopy for screening. This is patient's first colonoscopy.  Patient denies blood in stool or change in stool caliber.  There is no known family history of colon cancer or polyps.    Patient Active Problem List   Diagnosis     CARDIOVASCULAR SCREENING; LDL GOAL LESS THAN 160     Hypertension, goal below 140/90     24 hour handout given     Family history of premature CAD     Alcohol abuse     Lyme disease       Past Medical History:   Diagnosis Date     Alcohol abuse 2018    May 2, 2018        History reviewed. No pertinent surgical history.    Family History   Problem Relation Age of Onset     C.A.D. Father          at age 55,  massive MI,      Hypertension Brother      Diabetes Maternal Grandmother      Diabetes Maternal Grandfather      Diabetes Paternal Grandmother      Diabetes Paternal Grandfather      Psychotic Disorder Mother        No current outpatient medications on file.       Allergies   Allergen Reactions     No Clinical Screening - See Comments      Other reaction(s): Other - Describe In Comment Field  GINNEA PIGS  - trouble breathing, red itchy eyes  SCALLOPS - vomiting and diarrhea.     Seafood      Scallop       Pt reports that he has never smoked. He has never used smokeless tobacco. He reports current alcohol use. He reports that he does not use drugs.    Exam:  /89 (BP Location: Left arm)   Pulse 73   Temp 97.9  F (36.6  C) (Oral)   Resp 16   Ht 1.718 m (5' 7.64\")   Wt 79.4 kg (175 lb)   SpO2 100%   BMI 26.89 kg/m      Awake, Alert OX3  Lungs - CTA bilaterally  CV - RRR, no murmurs, distal pulses intact  Abd - soft, non-distended, non-tender, +BS  Extr - No cyanosis or edema    A/P 51 year old year old male in need of colonoscopy for screening. Risks, benefits, alternatives, and complications were discussed including the possibility of perforation, bleeding, missed lesion and the patient agreed to proceed    Stevo HERR " DO Eze on 4/2/2021 at 11:23 AM

## 2021-05-14 ENCOUNTER — PATIENT OUTREACH (OUTPATIENT)
Dept: CARE COORDINATION | Facility: CLINIC | Age: 51
End: 2021-05-14

## 2021-05-14 DIAGNOSIS — F10.10 ALCOHOL ABUSE: Primary | ICD-10-CM

## 2021-05-14 NOTE — LETTER
[facility] C[facility] COORDINATION  [address] [place] [address]    May 17, 2021    Mani Vu  [address] [facility] [address]      Dear Mani,    I am a clinic community health worker who works with Shania Mclain MD at [facility]. I have been trying to reach you recently to introduce Clinic Care Coordination and to see if there was anything I could assist you with.  Below is a description of clinic care coordination and how I can further assist you.      The clinic care coordination team is made up of a registered nurse,  and community health worker who understand the health care system. The goal of clinic care coordination is to help you manage your health and improve access to the health care system in the most efficient manner. The team can assist you in meeting your health care goals by providing education, coordinating services, strengthening the communication among your providers and supporting you with any resource needs.    Please feel free to contact me at [phone] with any questions or concerns. We are focused on providing you with the highest-quality healthcare experience possible and that all starts with you.     Sincerely,     GUILLAUME Doty  Clinic Care Coordination   [phone]  [email or web address]

## 2021-05-14 NOTE — PROGRESS NOTES
Clinic Care Coordination Contact  [facility]/Voicemail    Clinical Data: Care Coordinator Outreach  Outreach attempted x 1.  Left message on patient's voicemail with call back information and requested return call.  Plan: Care Coordinator will try to reach patient again in 1-2 business days.

## 2021-05-17 NOTE — PROGRESS NOTES
Clinic Care Coordination Contact  [facility]/Voicemail    Clinical Data: Care Coordinator Outreach  Outreach attempted x 2.  Left message on patient's voicemail with call back information and requested return call.  Plan: Care Coordinator will send unable to contact letter with care coordinator contact information via [employer]. Care Coordinator will do no further outreaches at this time.

## 2021-06-01 ENCOUNTER — TELEPHONE (OUTPATIENT)
Dept: FAMILY MEDICINE | Facility: CLINIC | Age: 51
End: 2021-06-01

## 2021-06-01 NOTE — TELEPHONE ENCOUNTER
Patient asking to be fit into Dr. Mclain schedule for hospital follow up and [place] papers, either today or tomorrow.

## 2021-06-02 ENCOUNTER — OFFICE VISIT (OUTPATIENT)
Dept: FAMILY MEDICINE | Facility: CLINIC | Age: 51
End: 2021-06-02
Payer: COMMERCIAL

## 2021-06-02 VITALS
HEIGHT: 68 IN | WEIGHT: 164 LBS | TEMPERATURE: 99 F | DIASTOLIC BLOOD PRESSURE: 90 MMHG | SYSTOLIC BLOOD PRESSURE: 148 MMHG | BODY MASS INDEX: 24.86 KG/M2 | HEART RATE: 109 BPM | OXYGEN SATURATION: 99 %

## 2021-06-02 DIAGNOSIS — F10.280 ALCOHOL DEPENDENCE WITH ALCOHOL-INDUCED ANXIETY DISORDER (H): ICD-10-CM

## 2021-06-02 DIAGNOSIS — R03.0 ELEVATED BLOOD PRESSURE READING WITHOUT DIAGNOSIS OF HYPERTENSION: ICD-10-CM

## 2021-06-02 DIAGNOSIS — F41.1 GENERALIZED ANXIETY DISORDER: Primary | ICD-10-CM

## 2021-06-02 DIAGNOSIS — R00.0 TACHYCARDIA: ICD-10-CM

## 2021-06-02 PROCEDURE — 99214 OFFICE O/P EST MOD 30 MIN: CPT | Performed by: FAMILY MEDICINE

## 2021-06-02 RX ORDER — NALTREXONE HYDROCHLORIDE 50 MG/1
50 TABLET, FILM COATED ORAL DAILY
Qty: 30 TABLET | Refills: 0 | Status: SHIPPED | OUTPATIENT
Start: 2021-06-02 | End: 2021-07-01

## 2021-06-02 RX ORDER — MULTIPLE VITAMINS W/ MINERALS TAB 9MG-400MCG
1 TAB ORAL DAILY
COMMUNITY
End: 2023-09-15

## 2021-06-02 RX ORDER — BUSPIRONE HYDROCHLORIDE 5 MG/1
5 TABLET ORAL 3 TIMES DAILY
Qty: 90 TABLET | Refills: 1 | Status: SHIPPED | OUTPATIENT
Start: 2021-06-02 | End: 2021-07-01

## 2021-06-02 ASSESSMENT — MIFFLIN-ST. JEOR: SCORE: 1572.69

## 2021-06-02 NOTE — PATIENT INSTRUCTIONS
*    Being honest is critical to recovery.     *    For anxiety, try a medication called Buspar.     *    For alcohol cravings, try a medication called Naltrexone. It's taken daily.     *    Let me know about the [employer] paperwork, we have it started.     *    Follow up appointment in one month.

## 2021-06-02 NOTE — PROGRESS NOTES
Assessment & Plan     (F41.1) Generalized anxiety disorder  (primary encounter diagnosis)  Comment: Longstanding, I am sure contributes to his alcohol abuse.  Reviewed options, will start BuSpar and advance as tolerated.  Reviewed this does have indication for both alcoholism and anxiety.  Plan: busPIRone (BUSPAR) 5 MG tablet        Reviewed side effects.  Follow-up in 1 month.  Ideally, will titrate him to 10 to 15 mg 3 times daily.    (F10.280) Alcohol dependence with alcohol-induced anxiety disorder (H)  Comment: Patient is attempting to enroll in an intensive outpatient treatment.  He seems motivated to to deal with his alcoholism.  Reviewed options, will try naltrexone to help with alcohol cravings.  Plan: naltrexone (DEPADE/REVIA) 50 MG tablet        Reviewed side effects but stressed the importance of establishing with chemical dependency treatment and aftercare programs.      (R00.0) Tachycardia  Comment: Probably secondary to mild withdrawal symptoms.  No signs of delirium tremors, hallucinations.  No specific treatment needed for his withdrawal except time.  Plan: Monitor, hold on beta-blocker therapy for now.  Will reassess in 1 month.    (R03.0) Elevated blood pressure reading without diagnosis of hypertension  Comment: See above.  Plan: Reassess in 1 month        Patient Instructions   *    Being honest is critical to recovery.     *    For anxiety, try a medication called Buspar.     *    For alcohol cravings, try a medication called Naltrexone. It's taken daily.     *    Let me know about the [facility] paperwork, we have it started.     *    Follow up appointment in one month.        Return in about 1 month (around 7/2/2021) for anxiety follow up.    Shania Mclain MD  [facility] RAKESH Verma is a 51 year old who presents for the following health issues     HPI       Hospital Follow-up Visit:    Hospital/Nursing Home/IP Rehab Facility: [facility]   Date of Admission:  "05/18/2021  Date of Discharge: 05/19/2021  Reason(s) for Admission: Alcohol abuse and withdrawal. Patient was prescribed mood medication at [facility] to help with anxiousness and shakiness, pt cannot remember the name of medication but says it really helped with his withdrawal symptoms. Patient is still feeling anxious but says his head is clear.       Was your hospitalization related to COVID-19? No   Problems taking medications regularly:  None   Medication changes since discharge: None   Problems adhering to non-medication therapy:  None    Summary of hospitalization:  CareEverywhere information obtained and reviewed  Diagnostic Tests/Treatments reviewed.  Follow up needed: none  Other Healthcare Providers Involved in Patient s Care:      Chemical dependency treatment  Update since discharge: improved.       Post Discharge Medication Reconciliation: discharge medications reconciled and changed, per note/orders.  Plan of care communicated with patient                  Review of Systems   CONSTITUTIONAL: NEGATIVE for fever, chills, change in weight  ENT/MOUTH: NEGATIVE for ear, mouth and throat problems  RESP: NEGATIVE for significant cough or SOB  GI: Denies epigastric discomfort, loose stools, constipation.  CV: NEGATIVE for chest pain, palpitations or peripheral edema  PSYCHIATRIC: Ongoing anxiety.      Objective    BP (!) 148/90 (BP Location: Right arm, Patient Position: Chair, Cuff Size: Adult Regular)   Pulse 109   Temp 99  F (37.2  C) (Tympanic)   Ht 1.718 m (5' 7.64\")   Wt 74.4 kg (164 lb)   SpO2 99%   BMI 25.20 kg/m    Body mass index is 25.2 kg/m .  Physical Exam   GENERAL: Healthy, alert and no distress  EYES: Eyes grossly normal to inspection, conjunctivae and sclerae normal  RESP: Lungs clear to auscultation - no rales, rhonchi or wheezes  CV: Regular rate and rhythm, normal S1 S2, no murmur  MS: No gross musculoskeletal defects noted, no edema  NEURO: Normal strength and tone, mentation intact " and speech normal  PSYCH: Mentation appears normal, affect normal/bright       Shania Mclain MD

## 2021-06-26 DIAGNOSIS — F41.1 GENERALIZED ANXIETY DISORDER: ICD-10-CM

## 2021-06-28 RX ORDER — BUSPIRONE HYDROCHLORIDE 5 MG/1
5 TABLET ORAL 3 TIMES DAILY
Qty: 90 TABLET | Refills: 1 | OUTPATIENT
Start: 2021-06-28

## 2021-07-01 ENCOUNTER — OFFICE VISIT (OUTPATIENT)
Dept: FAMILY MEDICINE | Facility: CLINIC | Age: 51
End: 2021-07-01
Payer: COMMERCIAL

## 2021-07-01 VITALS
HEART RATE: 71 BPM | DIASTOLIC BLOOD PRESSURE: 89 MMHG | TEMPERATURE: 97.3 F | BODY MASS INDEX: 26.28 KG/M2 | OXYGEN SATURATION: 97 % | SYSTOLIC BLOOD PRESSURE: 134 MMHG | WEIGHT: 171 LBS

## 2021-07-01 DIAGNOSIS — I10 HYPERTENSION, GOAL BELOW 140/90: ICD-10-CM

## 2021-07-01 DIAGNOSIS — F10.280 ALCOHOL DEPENDENCE WITH ALCOHOL-INDUCED ANXIETY DISORDER (H): ICD-10-CM

## 2021-07-01 DIAGNOSIS — F10.10 ALCOHOL ABUSE: Primary | ICD-10-CM

## 2021-07-01 DIAGNOSIS — M54.16 LUMBAR RADICULOPATHY: ICD-10-CM

## 2021-07-01 PROCEDURE — 99214 OFFICE O/P EST MOD 30 MIN: CPT | Performed by: FAMILY MEDICINE

## 2021-07-01 RX ORDER — NALTREXONE HYDROCHLORIDE 50 MG/1
50 TABLET, FILM COATED ORAL DAILY
Qty: 90 TABLET | Refills: 0 | Status: SHIPPED | OUTPATIENT
Start: 2021-07-01 | End: 2023-09-15

## 2021-07-01 RX ORDER — BUSPIRONE HYDROCHLORIDE 10 MG/1
10 TABLET ORAL 3 TIMES DAILY
Qty: 270 TABLET | Refills: 0 | Status: SHIPPED | OUTPATIENT
Start: 2021-07-01 | End: 2024-10-01

## 2021-07-01 RX ORDER — CYCLOBENZAPRINE HCL 10 MG
10 TABLET ORAL 3 TIMES DAILY PRN
Qty: 30 TABLET | Refills: 1 | Status: SHIPPED | OUTPATIENT
Start: 2021-07-01 | End: 2023-01-05

## 2021-07-01 ASSESSMENT — PAIN SCALES - GENERAL: PAINLEVEL: SEVERE PAIN (7)

## 2021-07-01 NOTE — PATIENT INSTRUCTIONS
*   You seem better.     *    For your back, try some muscle relaxer. Prescription sent.     *   Refill on the Buspar, will increase to 10 mg 3 times per day.     *   Continue on the Naltrexone.     *   FMLA page filled out.     *   Keep me updated as to what follow up is needed.

## 2021-07-01 NOTE — PROGRESS NOTES
Assessment & Plan     (F10.10) Alcohol abuse  (primary encounter diagnosis)  Comment: Patient states he is abstaining from alcohol.  Tolerating buspirone and naltrexone well, notes less alcohol cravings.  Plan: Continue with aftercare, he does see psychiatry within the next 3 weeks.    (I10) Hypertension, goal below 140/90  Comment: Within guidelines.  Plan: Continue 2 drug therapy, amlodipine plus ACE inhibitor.    (F10.280) Alcohol dependence with alcohol-induced anxiety disorder (H)  Comment: Continue naltrexone, will increase dose of BuSpar to 10 mg 3 times daily from 5 mg 3 times daily.  Plan: busPIRone (BUSPAR) 10 MG tablet, naltrexone         (DEPADE/REVIA) 50 MG tablet        Follow-up with psychiatry.  Advised that if he has difficulty following up with psychiatry, he may return to our clinic for follow-up of his alcohol abuse.  FL[employer] forms filled out.    (M54.16) Lumbar radiculopathy  Comment: Recurrent pattern, should be self-limited.  Patient has benefit from as needed muscle relaxant in the past.  Plan: cyclobenzaprine (FLEXERIL) 10 MG tablet        Refill given, reviewed side effects.  Follow-up if symptoms worsen or there is no improvement.    No results found for any visits on 07/01/21.     Patient Instructions   *   You seem better.     *    For your back, try some muscle relaxer. Prescription sent.     *   Refill on the Buspar, will increase to 10 mg 3 times per day.     *   Continue on the Naltrexone.     *   FMLA page filled out.     *   Keep me updated as to what follow up is needed.          Return in about 3 months (around 10/1/2021) for Alcohol abuse..    Shania Mclain MD  [facility] RAKESH Verma is a 51 year old who presents for the following health issues     HPI     Back Pain  Onset/Duration: Flared up around 5 days ago   Description:   Location of pain: Low back right  Character of pain: Sharp and burning  Pain radiation: Radiates into the right  buttocks  New numbness or weakness in legs, not attributed to pain: no   Intensity: Currently 7/10  Progression of Symptoms: Same and intermittent  History:   Specific cause: Old injury from time spent in the    Pain interferes with job: no  History of back problems: recurrent self limited episodes of low back pain in the past  Any previous MRI or X-rays: Yes  Sees a specialist for back pain: No  Alleviating factors:   Improved by: Patient says taking muscle relaxer's for a few days will usually resolve his back pain flare ups      Precipitating factors:  Worsened by: Lifting, Bending, Standing and Lying Flat  Therapies tried and outcome: acetaminophen (Tylenol), lidocaine patches, cold and heat with no relief.     Accompanying Signs & Symptoms:  Risk of Fracture: None  Risk of Cauda Equina: None  Risk of Infection: None  Risk of Cancer: None  Risk of Ankylosing Spondylitis: Onset at age <35, male, AND morning back stiffness  no      :[id number]}  Also addressed history of hypertension and alcohol abuse.    Review of Systems   CONSTITUTIONAL: NEGATIVE for fever, chills, change in weight  ENT/MOUTH: NEGATIVE for ear, mouth and throat problems  RESP: NEGATIVE for significant cough or SOB  CV: NEGATIVE for chest pain, palpitations or peripheral edema  MUSCULOSKELETAL: See above.  PSYCHIATRIC: Improved mood, less alcohol cravings.      Objective    /89 (BP Location: Left arm, Patient Position: Chair, Cuff Size: Adult Regular)   Pulse 71   Temp 97.3  F (36.3  C) (Tympanic)   Wt 77.6 kg (171 lb)   SpO2 97%   BMI 26.28 kg/m    Body mass index is 26.28 kg/m .  Physical Exam   GENERAL: Healthy, alert and no distress  EYES: Eyes grossly normal to inspection, conjunctivae and sclerae normal  RESP: Lungs clear to auscultation - no rales, rhonchi or wheezes  CV: Regular rate and rhythm, normal S1 S2, no murmur  MS: No gross musculoskeletal defects noted, no edema  NEURO: Normal strength and tone, mentation intact  and speech normal  PSYCH: Mentation appears normal, affect normal/bright     Shania Mclain MD

## 2021-10-23 ENCOUNTER — HEALTH MAINTENANCE LETTER (OUTPATIENT)
Age: 51
End: 2021-10-23

## 2021-12-18 ENCOUNTER — HEALTH MAINTENANCE LETTER (OUTPATIENT)
Age: 51
End: 2021-12-18

## 2021-12-21 ENCOUNTER — NURSE TRIAGE (OUTPATIENT)
Dept: NURSING | Facility: CLINIC | Age: 51
End: 2021-12-21
Payer: COMMERCIAL

## 2021-12-21 ENCOUNTER — TRANSFERRED RECORDS (OUTPATIENT)
Dept: HEALTH INFORMATION MANAGEMENT | Facility: CLINIC | Age: 51
End: 2021-12-21
Payer: COMMERCIAL
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
## 2021-12-28 ENCOUNTER — TRANSFERRED RECORDS (OUTPATIENT)
Dept: HEALTH INFORMATION MANAGEMENT | Facility: CLINIC | Age: 51
End: 2021-12-28
Payer: COMMERCIAL

## 2022-03-11 NOTE — PROGRESS NOTES
Assessment & Plan     (I10) Hypertension, goal below 140/90  (primary encounter diagnosis)  Comment: Within guidelines using 2 drug regimen, ACE inhibitor plus amlodipine.  Very good renal function.  Plan: Albumin Random Urine Quantitative with Creat         Ratio, lisinopril (ZESTRIL) 40 MG tablet,         amLODIPine (NORVASC) 5 MG tablet, Comprehensive        metabolic panel (BMP + Alb, Alk Phos, ALT, AST,        Total. Bili, TP)        New current medications, 1 year refill      (F10.10) Alcohol abuse  Comment: Followed by psychiatry, admits to recent drinking, currently maintaining sobriety and participating in his aftercare program.  Normal liver function including AST.  Plan: Comprehensive metabolic panel (BMP + Alb, Alk         Phos, ALT, AST, Total. Bili, TP)        Stressed importance of follow-up with psychiatry and his aftercare program.    (Z13.6) CARDIOVASCULAR SCREENING; LDL GOAL LESS THAN 160  Comment: Elevated panel, not on statin therapy.  Plan: Lipid panel reflex to direct LDL Fasting        Reviewed lifestyle, will monitor.  Recheck lipid profile in 1 to 2 years.    (A69.20) Lyme disease  Comment: Successfully treated, no recurrence.  Plan: Monitor for rash or unusual joint pain.    Results for orders placed or performed in visit on 03/15/22   Comprehensive metabolic panel (BMP + Alb, Alk Phos, ALT, AST, Total. Bili, TP)     Status: Abnormal   Result Value Ref Range    Sodium 137 133 - 144 mmol/L    Potassium 4.4 3.4 - 5.3 mmol/L    Chloride 102 94 - 109 mmol/L    Carbon Dioxide (CO2) 27 20 - 32 mmol/L    Anion Gap 8 3 - 14 mmol/L    Urea Nitrogen 12 7 - 30 mg/dL    Creatinine 1.11 0.66 - 1.25 mg/dL    Calcium 10.3 (H) 8.5 - 10.1 mg/dL    Glucose 98 70 - 99 mg/dL    Alkaline Phosphatase 42 40 - 150 U/L    AST 13 0 - 45 U/L    ALT 30 0 - 70 U/L    Protein Total 7.8 6.8 - 8.8 g/dL    Albumin 4.5 3.4 - 5.0 g/dL    Bilirubin Total 0.5 0.2 - 1.3 mg/dL    GFR Estimate 80 >60 mL/min/1.73m2   Lipid  "panel reflex to direct LDL Fasting     Status: Abnormal   Result Value Ref Range    Cholesterol 266 (H) <200 mg/dL    Triglycerides 87 <150 mg/dL    Direct Measure HDL 60 >=40 mg/dL    LDL Cholesterol Calculated 189 (H) <=100 mg/dL    Non HDL Cholesterol 206 (H) <130 mg/dL    Patient Fasting > 8hrs? No     Narrative    Cholesterol  Desirable:  <200 mg/dL    Triglycerides  Normal:  Less than 150 mg/dL  Borderline High:  150-199 mg/dL  High:  200-499 mg/dL  Very High:  Greater than or equal to 500 mg/dL    Direct Measure HDL  Female:  Greater than or equal to 50 mg/dL   Male:  Greater than or equal to 40 mg/dL    LDL Cholesterol  Desirable:  <100mg/dL  Above Desirable:  100-129 mg/dL   Borderline High:  130-159 mg/dL   High:  160-189 mg/dL   Very High:  >= 190 mg/dL    Non HDL Cholesterol  Desirable:  130 mg/dL  Above Desirable:  130-159 mg/dL  Borderline High:  160-189 mg/dL  High:  190-219 mg/dL  Very High:  Greater than or equal to 220 mg/dL   Albumin Random Urine Quantitative with Creat Ratio     Status: None   Result Value Ref Range    Creatinine Urine mg/dL 70 mg/dL    Albumin Urine mg/L 5 mg/L    Albumin Urine mg/g Cr 7.14 0.00 - 17.00 mg/g Cr        Patient Instructions   *   Blood tests today.     *   Refills on your medications.     *   Follow up appointment in 12 months.     *   Follow up with your psychiatrist.    }     BMI:   Estimated body mass index is 28.8 kg/m  as calculated from the following:    Height as of this encounter: 1.721 m (5' 7.75\").    Weight as of this encounter: 85.3 kg (188 lb).   Weight management plan: Discussed healthy diet and exercise guidelines        Return in about 1 year (around 3/15/2023) for Routine Visit.    Shania Mclain MD  [facility] RAKESH Verma is a 52 year old who presents for the following health issues   History of Present Illness       Hypertension: He presents for follow up of hypertension.  He does not check blood pressure  " "regularly outside of the clinic. Outpatient blood pressures have not been over 140/90. He follows a low salt diet. He consumes 0 sweetened beverage(s) daily.He exercises with enough effort to increase his heart rate 30 to 60 minutes per day.  He exercises with enough effort to increase his heart rate 4 days per week.   He is taking medications regularly.       Also addressed the following:        Hypertension, goal below 140/90  Alcohol abuse  CARDIOVASCULAR SCREENING; LDL GOAL LESS THAN 160  Lyme disease            Hypertension, goal below 140/90  Need for vaccination  Alcohol abuse  CARDIOVASCULAR SCREENING; LDL GOAL LESS THAN 160  Lyme disease     Review of Systems   CONSTITUTIONAL: NEGATIVE for fever, chills, change in weight  ENT/MOUTH: NEGATIVE for ear, mouth and throat problems  RESP: NEGATIVE for significant cough or SOB  CV: NEGATIVE for chest pain, palpitations or peripheral edema      Objective    /68   Pulse 82   Temp 98.3  F (36.8  C) (Tympanic)   Resp 18   Ht 1.721 m (5' 7.75\")   Wt 85.3 kg (188 lb)   SpO2 99%   BMI 28.80 kg/m    Body mass index is 28.8 kg/m .  Physical Exam   GENERAL: Healthy, alert and no distress  EYES: Eyes grossly normal to inspection, conjunctivae and sclerae normal  RESP: Lungs clear to auscultation - no rales, rhonchi or wheezes  CV: Regular rate and rhythm, normal S1 S2, no murmur  GI:  soft, nontender, no HSM   MS: No gross musculoskeletal defects noted, no edema  NEURO: Normal strength and tone, mentation intact and speech normal  PSYCH: Mentation appears normal, affect normal/bright         Shania Mclain MD   "

## 2022-03-15 ENCOUNTER — OFFICE VISIT (OUTPATIENT)
Dept: FAMILY MEDICINE | Facility: CLINIC | Age: 52
End: 2022-03-15
Payer: COMMERCIAL

## 2022-03-15 VITALS
HEART RATE: 82 BPM | RESPIRATION RATE: 18 BRPM | TEMPERATURE: 98.3 F | HEIGHT: 68 IN | BODY MASS INDEX: 28.49 KG/M2 | SYSTOLIC BLOOD PRESSURE: 108 MMHG | OXYGEN SATURATION: 99 % | DIASTOLIC BLOOD PRESSURE: 68 MMHG | WEIGHT: 188 LBS

## 2022-03-15 DIAGNOSIS — I10 HYPERTENSION, GOAL BELOW 140/90: Primary | ICD-10-CM

## 2022-03-15 DIAGNOSIS — F10.10 ALCOHOL ABUSE: ICD-10-CM

## 2022-03-15 DIAGNOSIS — A69.20 LYME DISEASE: ICD-10-CM

## 2022-03-15 DIAGNOSIS — Z13.6 CARDIOVASCULAR SCREENING; LDL GOAL LESS THAN 160: ICD-10-CM

## 2022-03-15 LAB
ALBUMIN SERPL-MCNC: 4.5 G/DL (ref 3.4–5)
ALP SERPL-CCNC: 42 U/L (ref 40–150)
ALT SERPL W P-5'-P-CCNC: 30 U/L (ref 0–70)
ANION GAP SERPL CALCULATED.3IONS-SCNC: 8 MMOL/L (ref 3–14)
AST SERPL W P-5'-P-CCNC: 13 U/L (ref 0–45)
BILIRUB SERPL-MCNC: 0.5 MG/DL (ref 0.2–1.3)
BUN SERPL-MCNC: 12 MG/DL (ref 7–30)
CALCIUM SERPL-MCNC: 10.3 MG/DL (ref 8.5–10.1)
CHLORIDE BLD-SCNC: 102 MMOL/L (ref 94–109)
CHOLEST SERPL-MCNC: 266 MG/DL
CO2 SERPL-SCNC: 27 MMOL/L (ref 20–32)
CREAT SERPL-MCNC: 1.11 MG/DL (ref 0.66–1.25)
CREAT UR-MCNC: 70 MG/DL
FASTING STATUS PATIENT QL REPORTED: NO
GFR SERPL CREATININE-BSD FRML MDRD: 80 ML/MIN/1.73M2
GLUCOSE BLD-MCNC: 98 MG/DL (ref 70–99)
HDLC SERPL-MCNC: 60 MG/DL
LDLC SERPL CALC-MCNC: 189 MG/DL
MICROALBUMIN UR-MCNC: 5 MG/L
MICROALBUMIN/CREAT UR: 7.14 MG/G CR (ref 0–17)
NONHDLC SERPL-MCNC: 206 MG/DL
POTASSIUM BLD-SCNC: 4.4 MMOL/L (ref 3.4–5.3)
PROT SERPL-MCNC: 7.8 G/DL (ref 6.8–8.8)
SODIUM SERPL-SCNC: 137 MMOL/L (ref 133–144)
TRIGL SERPL-MCNC: 87 MG/DL

## 2022-03-15 PROCEDURE — 99214 OFFICE O/P EST MOD 30 MIN: CPT | Performed by: FAMILY MEDICINE

## 2022-03-15 PROCEDURE — 82043 UR ALBUMIN QUANTITATIVE: CPT | Performed by: FAMILY MEDICINE

## 2022-03-15 PROCEDURE — 36415 COLL VENOUS BLD VENIPUNCTURE: CPT | Performed by: FAMILY MEDICINE

## 2022-03-15 PROCEDURE — 80061 LIPID PANEL: CPT | Performed by: FAMILY MEDICINE

## 2022-03-15 PROCEDURE — 80053 COMPREHEN METABOLIC PANEL: CPT | Performed by: FAMILY MEDICINE

## 2022-03-15 RX ORDER — AMLODIPINE BESYLATE 5 MG/1
5 TABLET ORAL DAILY
Qty: 90 TABLET | Refills: 3 | Status: SHIPPED | OUTPATIENT
Start: 2022-03-15 | End: 2022-06-11

## 2022-03-15 RX ORDER — LISINOPRIL 40 MG/1
40 TABLET ORAL DAILY
Qty: 90 TABLET | Refills: 3 | Status: SHIPPED | OUTPATIENT
Start: 2022-03-15 | End: 2022-06-06

## 2022-03-15 ASSESSMENT — PAIN SCALES - GENERAL: PAINLEVEL: NO PAIN (0)

## 2022-03-15 NOTE — PATIENT INSTRUCTIONS
*   Blood tests today.     *   Refills on your medications.     *   Follow up appointment in 12 months.     *   Follow up with your psychiatrist.

## 2022-06-04 ENCOUNTER — HEALTH MAINTENANCE LETTER (OUTPATIENT)
Age: 52
End: 2022-06-04

## 2022-06-04 DIAGNOSIS — I10 HYPERTENSION, GOAL BELOW 140/90: ICD-10-CM

## 2022-06-06 RX ORDER — LISINOPRIL 40 MG/1
TABLET ORAL
Qty: 90 TABLET | Refills: 2 | Status: SHIPPED | OUTPATIENT
Start: 2022-06-06 | End: 2023-01-05

## 2022-06-10 DIAGNOSIS — I10 HYPERTENSION, GOAL BELOW 140/90: ICD-10-CM

## 2022-06-10 DIAGNOSIS — L65.9 HAIR LOSS: ICD-10-CM

## 2022-06-11 RX ORDER — AMLODIPINE BESYLATE 5 MG/1
TABLET ORAL
Qty: 90 TABLET | Refills: 2 | Status: SHIPPED | OUTPATIENT
Start: 2022-06-11 | End: 2023-01-05

## 2022-06-11 RX ORDER — FINASTERIDE 5 MG/1
TABLET, FILM COATED ORAL
Qty: 90 TABLET | Refills: 2 | Status: SHIPPED | OUTPATIENT
Start: 2022-06-11 | End: 2023-01-05

## 2022-09-07 DIAGNOSIS — I10 HYPERTENSION, GOAL BELOW 140/90: ICD-10-CM

## 2022-09-08 RX ORDER — AMLODIPINE BESYLATE 5 MG/1
TABLET ORAL
Qty: 90 TABLET | Refills: 2 | OUTPATIENT
Start: 2022-09-08

## 2022-10-09 ENCOUNTER — HEALTH MAINTENANCE LETTER (OUTPATIENT)
Age: 52
End: 2022-10-09

## 2023-01-04 ASSESSMENT — ENCOUNTER SYMPTOMS
ABDOMINAL PAIN: 0
FEVER: 0
WEAKNESS: 0
DIARRHEA: 0
EYE PAIN: 0
ARTHRALGIAS: 0
PARESTHESIAS: 0
HEMATURIA: 0
DYSURIA: 0
JOINT SWELLING: 0
HEADACHES: 0
CHILLS: 0
NERVOUS/ANXIOUS: 1
HEMATOCHEZIA: 0
PALPITATIONS: 1
COUGH: 0
DIZZINESS: 0
HEARTBURN: 1
FREQUENCY: 0
NAUSEA: 0
MYALGIAS: 0
SORE THROAT: 0
SHORTNESS OF BREATH: 0
CONSTIPATION: 0

## 2023-01-05 ENCOUNTER — OFFICE VISIT (OUTPATIENT)
Dept: FAMILY MEDICINE | Facility: CLINIC | Age: 53
End: 2023-01-05
Payer: COMMERCIAL

## 2023-01-05 VITALS
SYSTOLIC BLOOD PRESSURE: 124 MMHG | HEART RATE: 103 BPM | DIASTOLIC BLOOD PRESSURE: 78 MMHG | RESPIRATION RATE: 16 BRPM | HEIGHT: 67 IN | TEMPERATURE: 97.9 F | BODY MASS INDEX: 28.25 KG/M2 | OXYGEN SATURATION: 98 % | WEIGHT: 180 LBS

## 2023-01-05 DIAGNOSIS — A69.20 LYME DISEASE: ICD-10-CM

## 2023-01-05 DIAGNOSIS — I10 HYPERTENSION, GOAL BELOW 140/90: ICD-10-CM

## 2023-01-05 DIAGNOSIS — L65.9 HAIR LOSS: ICD-10-CM

## 2023-01-05 DIAGNOSIS — Z12.5 SCREENING FOR PROSTATE CANCER: ICD-10-CM

## 2023-01-05 DIAGNOSIS — R73.09 ABNORMAL GLUCOSE: ICD-10-CM

## 2023-01-05 DIAGNOSIS — Z00.00 ROUTINE GENERAL MEDICAL EXAMINATION AT A HEALTH CARE FACILITY: Primary | ICD-10-CM

## 2023-01-05 DIAGNOSIS — E78.5 HYPERLIPIDEMIA WITH TARGET LDL LESS THAN 130: ICD-10-CM

## 2023-01-05 DIAGNOSIS — F10.280 ALCOHOL DEPENDENCE WITH ALCOHOL-INDUCED ANXIETY DISORDER (H): ICD-10-CM

## 2023-01-05 LAB
ALBUMIN SERPL-MCNC: 4.8 G/DL (ref 3.4–5)
ALP SERPL-CCNC: 41 U/L (ref 40–150)
ALT SERPL W P-5'-P-CCNC: 39 U/L (ref 0–70)
ANION GAP SERPL CALCULATED.3IONS-SCNC: 5 MMOL/L (ref 3–14)
AST SERPL W P-5'-P-CCNC: 27 U/L (ref 0–45)
BILIRUB SERPL-MCNC: 0.8 MG/DL (ref 0.2–1.3)
BUN SERPL-MCNC: 18 MG/DL (ref 7–30)
CALCIUM SERPL-MCNC: 9.5 MG/DL (ref 8.5–10.1)
CHLORIDE BLD-SCNC: 105 MMOL/L (ref 94–109)
CHOLEST SERPL-MCNC: 236 MG/DL
CO2 SERPL-SCNC: 28 MMOL/L (ref 20–32)
CREAT SERPL-MCNC: 1.06 MG/DL (ref 0.66–1.25)
FASTING STATUS PATIENT QL REPORTED: YES
GFR SERPL CREATININE-BSD FRML MDRD: 85 ML/MIN/1.73M2
GLUCOSE BLD-MCNC: 96 MG/DL (ref 70–99)
HBA1C MFR BLD: 5.7 % (ref 0–5.6)
HDLC SERPL-MCNC: 73 MG/DL
LDLC SERPL CALC-MCNC: 150 MG/DL
NONHDLC SERPL-MCNC: 163 MG/DL
POTASSIUM BLD-SCNC: 4.5 MMOL/L (ref 3.4–5.3)
PROT SERPL-MCNC: 7.8 G/DL (ref 6.8–8.8)
PSA SERPL-MCNC: 2.1 UG/L (ref 0–4)
SODIUM SERPL-SCNC: 138 MMOL/L (ref 133–144)
TRIGL SERPL-MCNC: 63 MG/DL

## 2023-01-05 PROCEDURE — 99396 PREV VISIT EST AGE 40-64: CPT | Performed by: FAMILY MEDICINE

## 2023-01-05 PROCEDURE — G0103 PSA SCREENING: HCPCS | Performed by: FAMILY MEDICINE

## 2023-01-05 PROCEDURE — 83036 HEMOGLOBIN GLYCOSYLATED A1C: CPT | Performed by: FAMILY MEDICINE

## 2023-01-05 PROCEDURE — 82570 ASSAY OF URINE CREATININE: CPT | Performed by: FAMILY MEDICINE

## 2023-01-05 PROCEDURE — 82043 UR ALBUMIN QUANTITATIVE: CPT | Performed by: FAMILY MEDICINE

## 2023-01-05 PROCEDURE — 80061 LIPID PANEL: CPT | Performed by: FAMILY MEDICINE

## 2023-01-05 PROCEDURE — 80053 COMPREHEN METABOLIC PANEL: CPT | Performed by: FAMILY MEDICINE

## 2023-01-05 PROCEDURE — 36415 COLL VENOUS BLD VENIPUNCTURE: CPT | Performed by: FAMILY MEDICINE

## 2023-01-05 PROCEDURE — 99213 OFFICE O/P EST LOW 20 MIN: CPT | Mod: 25 | Performed by: FAMILY MEDICINE

## 2023-01-05 RX ORDER — FINASTERIDE 5 MG/1
1 TABLET, FILM COATED ORAL DAILY
Qty: 90 TABLET | Refills: 3 | Status: SHIPPED | OUTPATIENT
Start: 2023-01-05 | End: 2023-09-15

## 2023-01-05 RX ORDER — AMLODIPINE BESYLATE 5 MG/1
5 TABLET ORAL DAILY
Qty: 90 TABLET | Refills: 3 | Status: SHIPPED | OUTPATIENT
Start: 2023-01-05 | End: 2023-07-19

## 2023-01-05 RX ORDER — LISINOPRIL 40 MG/1
40 TABLET ORAL DAILY
Qty: 90 TABLET | Refills: 3 | Status: SHIPPED | OUTPATIENT
Start: 2023-01-05 | End: 2023-02-27

## 2023-01-05 ASSESSMENT — ENCOUNTER SYMPTOMS
CONSTIPATION: 0
SHORTNESS OF BREATH: 0
JOINT SWELLING: 0
SORE THROAT: 0
DIZZINESS: 0
COUGH: 0
HEARTBURN: 1
ABDOMINAL PAIN: 0
WEAKNESS: 0
ARTHRALGIAS: 0
DIARRHEA: 0
PARESTHESIAS: 0
PALPITATIONS: 1
HEADACHES: 0
HEMATURIA: 0
NAUSEA: 0
FEVER: 0
EYE PAIN: 0
FREQUENCY: 0
DYSURIA: 0
NERVOUS/ANXIOUS: 1
CHILLS: 0
HEMATOCHEZIA: 0
MYALGIAS: 0

## 2023-01-05 NOTE — PATIENT INSTRUCTIONS
Preventive Health Recommendations  Male Ages 50 - 64    Good luck with maintaining sobriety.     Blood and urine tests today.    Think about getting the new Covid booster.     Refills on your medications for one year.       Yearly exam:             See your health care provider every year in order to  o   Review health changes.   o   Discuss preventive care.    o   Review your medicines if your doctor has prescribed any.   Have a cholesterol test every 5 years, or more frequently if you are at risk for high cholesterol/heart disease.   Have a diabetes test (fasting glucose) every three years. If you are at risk for diabetes, you should have this test more often.   Have a colonoscopy at age 50, or have a yearly FIT test (stool test). These exams will check for colon cancer.    Talk with your health care provider about whether or not a prostate cancer screening test (PSA) is right for you.  You should be tested each year for STDs (sexually transmitted diseases), if you re at risk.     Shots: Get a flu shot each year. Get a tetanus shot every 10 years.     Nutrition:  Eat at least 5 servings of fruits and vegetables daily.   Eat whole-grain bread, whole-wheat pasta and brown rice instead of white grains and rice.   Get adequate Calcium and Vitamin D.     Lifestyle  Exercise for at least 150 minutes a week (30 minutes a day, 5 days a week). This will help you control your weight and prevent disease.   Limit alcohol to one drink per day.   No smoking.   Wear sunscreen to prevent skin cancer.   See your dentist every six months for an exam and cleaning.   See your eye doctor every 1 to 2 years.

## 2023-01-05 NOTE — PROGRESS NOTES
SUBJECTIVE:   CC: Bayron is an 52 year old who presents for preventative health visit.   Patient has been advised of split billing requirements and indicates understanding: Yes  Healthy Habits:     Getting at least 3 servings of Calcium per day:  Yes    Bi-annual eye exam:  Yes    Dental care twice a year:  NO    Sleep apnea or symptoms of sleep apnea:  None    Diet:  Low salt and Carbohydrate counting    Frequency of exercise:  4-5 days/week    Duration of exercise:  30-45 minutes    Taking medications regularly:  Yes    Medication side effects:  None    PHQ-2 Total Score: 2    Additional concerns today:  No    Patient would still like to discuss the following concern(s):  1. medication check          PROBLEMS TO ADD ON...    Hypertension, goal below 140/90  Hair loss  Alcohol dependence with alcohol-induced anxiety disorder (H)  CARDIOVASCULAR SCREENING; LDL GOAL LESS THAN 160  Lyme disease  Abnormal glucose  Screening for prostate cancer     Today's PHQ-2 Score:   PHQ-2 ( 1999 [employer]) 1/4/2023   Q1: Little interest or pleasure in doing things 1   Q2: Feeling down, depressed or hopeless 1   PHQ-2 Score 2   PHQ-2 Total Score (12-17 Years)- Positive if 3 or more points; Administer PHQ-A if positive -   Q1: Little interest or pleasure in doing things Several days   Q2: Feeling down, depressed or hopeless Several days   PHQ-2 Score 2           Social History     Tobacco Use     Smoking status: Former     Types: Dip, chew, snus or snuff     Smokeless tobacco: Former     Quit date: 11/15/2014   Substance Use Topics     Alcohol use: Not Currently     Comment: I abstain from use.         Alcohol Use 1/4/2023   Prescreen: >3 drinks/day or >7 drinks/week? Not Applicable       Last PSA:   PSA   Date Value Ref Range Status   03/19/2021 1.57 0 - 4 ug/L Final     Comment:     Assay Method:  Chemiluminescence using Siemens Vista analyzer       Reviewed orders with patient. Reviewed health maintenance and updated orders accordingly  "- Yes  Labs reviewed in EPIC    Reviewed and updated as needed this visit by clinical staff   Tobacco  Allergies  Meds              Reviewed and updated as needed this visit by Provider                     Review of Systems   Constitutional: Negative for chills and fever.   HENT: Negative for congestion, ear pain, hearing loss and sore throat.    Eyes: Negative for pain and visual disturbance.   Respiratory: Negative for cough and shortness of breath.    Cardiovascular: Positive for palpitations. Negative for chest pain and peripheral edema.   Gastrointestinal: Positive for heartburn. Negative for abdominal pain, constipation, diarrhea, hematochezia and nausea.   Genitourinary: Negative for dysuria, frequency, genital sores, hematuria, impotence, penile discharge and urgency.   Musculoskeletal: Negative for arthralgias, joint swelling and myalgias.   Skin: Negative for rash.   Neurological: Negative for dizziness, weakness, headaches and paresthesias.   Psychiatric/Behavioral: Positive for mood changes. The patient is nervous/anxious.        OBJECTIVE:   /78   Pulse 103   Temp 97.9  F (36.6  C) (Temporal)   Resp 16   Ht 1.713 m (5' 7.44\")   Wt 81.6 kg (180 lb)   SpO2 98%   BMI 27.82 kg/m      Physical Exam  GENERAL: Healthy, alert and no distress  EYES: Eyes grossly normal to inspection, conjunctivae and sclerae normal  RESP: Lungs clear to auscultation - no rales, rhonchi or wheezes  CV: Regular rate and rhythm, normal S1 S2, no murmur  MS: No gross musculoskeletal defects noted, no edema  NEURO: Normal strength and tone, mentation intact and speech normal  PSYCH: Mentation appears normal, affect normal/bright     Diagnostic Test Results:  Labs reviewed in Epic  Results for orders placed or performed in visit on 01/05/23 (from the past 24 hour(s))   Lipid panel reflex to direct LDL Fasting   Result Value Ref Range    Cholesterol 236 (H) <200 mg/dL    Triglycerides 63 <150 mg/dL    Direct Measure HDL " 73 >=40 mg/dL    LDL Cholesterol Calculated 150 (H) <=100 mg/dL    Non HDL Cholesterol 163 (H) <130 mg/dL    Patient Fasting > 8hrs? Yes     Narrative    Cholesterol  Desirable:  <200 mg/dL    Triglycerides  Normal:  Less than 150 mg/dL  Borderline High:  150-199 mg/dL  High:  200-499 mg/dL  Very High:  Greater than or equal to 500 mg/dL    Direct Measure HDL  Female:  Greater than or equal to 50 mg/dL   Male:  Greater than or equal to 40 mg/dL    LDL Cholesterol  Desirable:  <100mg/dL  Above Desirable:  100-129 mg/dL   Borderline High:  130-159 mg/dL   High:  160-189 mg/dL   Very High:  >= 190 mg/dL    Non HDL Cholesterol  Desirable:  130 mg/dL  Above Desirable:  130-159 mg/dL  Borderline High:  160-189 mg/dL  High:  190-219 mg/dL  Very High:  Greater than or equal to 220 mg/dL   Hemoglobin A1c   Result Value Ref Range    Hemoglobin A1C 5.7 (H) 0.0 - 5.6 %   Comprehensive metabolic panel (BMP + Alb, Alk Phos, ALT, AST, Total. Bili, TP)   Result Value Ref Range    Sodium 138 133 - 144 mmol/L    Potassium 4.5 3.4 - 5.3 mmol/L    Chloride 105 94 - 109 mmol/L    Carbon Dioxide (CO2) 28 20 - 32 mmol/L    Anion Gap 5 3 - 14 mmol/L    Urea Nitrogen 18 7 - 30 mg/dL    Creatinine 1.06 0.66 - 1.25 mg/dL    Calcium 9.5 8.5 - 10.1 mg/dL    Glucose 96 70 - 99 mg/dL    Alkaline Phosphatase 41 40 - 150 U/L    AST 27 0 - 45 U/L    ALT 39 0 - 70 U/L    Protein Total 7.8 6.8 - 8.8 g/dL    Albumin 4.8 3.4 - 5.0 g/dL    Bilirubin Total 0.8 0.2 - 1.3 mg/dL    GFR Estimate 85 >60 mL/min/1.73m2   PSA, screen   Result Value Ref Range    Prostate Specific Antigen Screen 2.10 0.00 - 4.00 ug/L       ASSESSMENT/PLAN:     (Z00.00) Routine general medical examination at a health care facility  (primary encounter diagnosis)  Comment:  Reviewed the need for periodic healthcare exams and screenings.  Plan: Advise yearly physicals.    (I10) Hypertension, goal below 140/90  Comment: Within guidelines using 2 drug regimen.  Very good kidney  function.  Plan: amLODIPine (NORVASC) 5 MG tablet, lisinopril         (ZESTRIL) 40 MG tablet, Albumin Random Urine         Quantitative with Creat Ratio, Comprehensive         metabolic panel (BMP + Alb, Alk Phos, ALT, AST,        Total. Bili, TP)        Continue.  1 year refill.    (L65.9) Hair loss  Comment: Patient pleased with results.  Denies side effects.  Plan: finasteride (PROSCAR) 5 MG tablet        Continue.    (F10.280) Alcohol dependence with alcohol-induced anxiety disorder (H)  Comment: Long history, states he drank alcohol approximately 11 months ago, followed by psychiatry, on naltrexone and BuSpar.  He states he is maintaining sobriety since his last episode  Plan: Comprehensive metabolic panel (BMP + Alb, Alk         Phos, ALT, AST, Total. Bili, TP)        Continue current management.    (Z13.6) CARDIOVASCULAR SCREENING; LDL GOAL LESS THAN 160  Comment: Elevated panel, given family history of heart disease, will start moderate intensity statin therapy.  Plan: Lipid panel reflex to direct LDL Fasting           (A69.20) Lyme disease  Comment: Appears resolved, no residual joint pain.  Plan: Monitor.    (R73.09) Abnormal glucose  Comment: Patient would be considered prediabetic with an A1c of 5.7.  Plan: Hemoglobin A1c        Reviewed lifestyle.    (Z12.5) Screening for prostate cancer  Comment: Normal.  Plan: PSA, screen        Repeat yearly until age 70.    Patient Instructions     Preventive Health Recommendations  Male Ages 50 - 64    Good luck with maintaining sobriety.     Blood and urine tests today.    Think about getting the new Covid booster.     Refills on your medications for one year.          Patient has been advised of split billing requirements and indicates understanding: Yes      COUNSELING:   Reviewed preventive health counseling, as reflected in patient instructions       Regular exercise       Healthy diet/nutrition       Vision screening       Alcohol Use        Colorectal cancer  "screening       Prostate cancer screening      BMI:   Estimated body mass index is 27.82 kg/m  as calculated from the following:    Height as of this encounter: 1.713 m (5' 7.44\").    Weight as of this encounter: 81.6 kg (180 lb).   Weight management plan: Discussed healthy diet and exercise guidelines      He reports that he has quit smoking. His smoking use included dip, chew, snus or snuff. He quit smokeless tobacco use about 8 years ago.        Shania Mclain MD  [facility]  "

## 2023-01-06 LAB
CREAT UR-MCNC: 204 MG/DL
MICROALBUMIN UR-MCNC: 10 MG/L
MICROALBUMIN/CREAT UR: 4.9 MG/G CR (ref 0–17)

## 2023-01-06 RX ORDER — ATORVASTATIN CALCIUM 20 MG/1
20 TABLET, FILM COATED ORAL DAILY
Qty: 90 TABLET | Refills: 3 | Status: SHIPPED | OUTPATIENT
Start: 2023-01-06 | End: 2024-02-02

## 2023-02-27 DIAGNOSIS — I10 HYPERTENSION, GOAL BELOW 140/90: ICD-10-CM

## 2023-02-27 RX ORDER — LISINOPRIL 40 MG/1
TABLET ORAL
Qty: 90 TABLET | Refills: 2 | Status: SHIPPED | OUTPATIENT
Start: 2023-02-27 | End: 2023-09-15

## 2023-06-29 ENCOUNTER — MYC MEDICAL ADVICE (OUTPATIENT)
Dept: FAMILY MEDICINE | Facility: CLINIC | Age: 53
End: 2023-06-29
Payer: COMMERCIAL

## 2023-07-18 DIAGNOSIS — I10 HYPERTENSION, GOAL BELOW 140/90: ICD-10-CM

## 2023-07-19 RX ORDER — AMLODIPINE BESYLATE 5 MG/1
TABLET ORAL
Qty: 90 TABLET | Refills: 3 | Status: SHIPPED | OUTPATIENT
Start: 2023-07-19 | End: 2023-09-15

## 2023-08-23 ENCOUNTER — TELEPHONE (OUTPATIENT)
Dept: FAMILY MEDICINE | Facility: CLINIC | Age: 53
End: 2023-08-23

## 2023-08-23 NOTE — TELEPHONE ENCOUNTER
Reason for Call:  Appointment Request    Patient requesting this type of appt: Chronic Diease Management/Medication/Follow-Up    Requested provider: Shania Mclain    Reason patient unable to be scheduled: Not within requested timeframe    When does patient want to be seen/preferred time:  8/25  and on    Comments: based on calcium buildup, at risk for heart attack - F/up on heart scan pt got yesterday and discuss treatment plan     [facility] we [facility] this information to you [facility] or would you prefer to receive a phone call?:   No preference   Okay to leave a detailed message?: Yes at Cell number on file:    Telephone Information:   Mobile [phone]       Call taken on 8/23/2023 at 8:20 AM by Abbi Mackenzie  
Called patient at [phone]. Left message to return call to the clinic.    Is patient having symptoms? If not should be okay to see PCP when he returns after 8/29/23.    Dena Moody RN  
Patient states no symptoms of chest pain/SOB/arm tingling/numbness/jaw pain. RN encouraged ER with these symptoms.     RN offered to schedule with Dr. Mclain   Patient stated he prefers to be referred to the cardiologist.   Patient did schedule 9/15/23 with Dr. Mclain.   He would prefer to see a cardiologist if Dr. Mclain would place the referral instead of this appointment.     RN routing to Dr. Mclain to review.     Soledad Landry RN on 8/23/2023 at 12:19 PM              
Spoke with patient and he wanted to discuss test results and if it would be okay for him to wait until provider returns.  
Fall with Harm Risk

## 2023-09-15 ENCOUNTER — OFFICE VISIT (OUTPATIENT)
Dept: FAMILY MEDICINE | Facility: CLINIC | Age: 53
End: 2023-09-15
Payer: COMMERCIAL

## 2023-09-15 VITALS
OXYGEN SATURATION: 99 % | BODY MASS INDEX: 23.95 KG/M2 | HEART RATE: 69 BPM | DIASTOLIC BLOOD PRESSURE: 64 MMHG | HEIGHT: 68 IN | TEMPERATURE: 98 F | RESPIRATION RATE: 12 BRPM | WEIGHT: 158 LBS | SYSTOLIC BLOOD PRESSURE: 122 MMHG

## 2023-09-15 DIAGNOSIS — Z23 NEED FOR HEPATITIS A AND B VACCINATION: ICD-10-CM

## 2023-09-15 DIAGNOSIS — R73.09 ABNORMAL GLUCOSE: ICD-10-CM

## 2023-09-15 DIAGNOSIS — F10.280 ALCOHOL DEPENDENCE WITH ALCOHOL-INDUCED ANXIETY DISORDER (H): ICD-10-CM

## 2023-09-15 DIAGNOSIS — Z12.5 SCREENING FOR PROSTATE CANCER: ICD-10-CM

## 2023-09-15 DIAGNOSIS — I10 HYPERTENSION, GOAL BELOW 140/90: ICD-10-CM

## 2023-09-15 DIAGNOSIS — E78.5 HYPERLIPIDEMIA LDL GOAL <70: ICD-10-CM

## 2023-09-15 DIAGNOSIS — Z23 NEED FOR INFLUENZA VACCINATION: ICD-10-CM

## 2023-09-15 DIAGNOSIS — Z23 NEED FOR STREPTOCOCCUS PNEUMONIAE VACCINATION: ICD-10-CM

## 2023-09-15 DIAGNOSIS — Z00.00 ROUTINE GENERAL MEDICAL EXAMINATION AT A HEALTH CARE FACILITY: Primary | ICD-10-CM

## 2023-09-15 DIAGNOSIS — L65.9 HAIR LOSS: ICD-10-CM

## 2023-09-15 DIAGNOSIS — R93.1 ABNORMAL NUCLEAR CARDIAC IMAGING TEST: ICD-10-CM

## 2023-09-15 LAB — HBA1C MFR BLD: 5.6 % (ref 0–5.6)

## 2023-09-15 PROCEDURE — 90472 IMMUNIZATION ADMIN EACH ADD: CPT | Performed by: FAMILY MEDICINE

## 2023-09-15 PROCEDURE — 90636 HEP A/HEP B VACC ADULT IM: CPT | Performed by: FAMILY MEDICINE

## 2023-09-15 PROCEDURE — 90682 RIV4 VACC RECOMBINANT DNA IM: CPT | Performed by: FAMILY MEDICINE

## 2023-09-15 PROCEDURE — 83036 HEMOGLOBIN GLYCOSYLATED A1C: CPT | Performed by: FAMILY MEDICINE

## 2023-09-15 PROCEDURE — 99396 PREV VISIT EST AGE 40-64: CPT | Mod: 25 | Performed by: FAMILY MEDICINE

## 2023-09-15 PROCEDURE — 36415 COLL VENOUS BLD VENIPUNCTURE: CPT | Performed by: FAMILY MEDICINE

## 2023-09-15 PROCEDURE — 90677 PCV20 VACCINE IM: CPT | Performed by: FAMILY MEDICINE

## 2023-09-15 PROCEDURE — 99214 OFFICE O/P EST MOD 30 MIN: CPT | Mod: 25 | Performed by: FAMILY MEDICINE

## 2023-09-15 PROCEDURE — 90471 IMMUNIZATION ADMIN: CPT | Performed by: FAMILY MEDICINE

## 2023-09-15 RX ORDER — LISINOPRIL 40 MG/1
40 TABLET ORAL DAILY
Qty: 90 TABLET | Refills: 3 | Status: SHIPPED | OUTPATIENT
Start: 2023-09-15

## 2023-09-15 RX ORDER — ATORVASTATIN CALCIUM 40 MG/1
40 TABLET, FILM COATED ORAL DAILY
Qty: 90 TABLET | Refills: 3 | Status: SHIPPED | OUTPATIENT
Start: 2023-09-15

## 2023-09-15 RX ORDER — FINASTERIDE 5 MG/1
1 TABLET, FILM COATED ORAL DAILY
Qty: 90 TABLET | Refills: 3 | Status: SHIPPED | OUTPATIENT
Start: 2023-09-15

## 2023-09-15 RX ORDER — NALTREXONE HYDROCHLORIDE 50 MG/1
50 TABLET, FILM COATED ORAL DAILY
Qty: 90 TABLET | Refills: 3 | Status: SHIPPED | OUTPATIENT
Start: 2023-09-15

## 2023-09-15 RX ORDER — AMLODIPINE BESYLATE 5 MG/1
5 TABLET ORAL DAILY
Qty: 90 TABLET | Refills: 3 | Status: SHIPPED | OUTPATIENT
Start: 2023-09-15 | End: 2024-09-19

## 2023-09-15 RX ORDER — ATORVASTATIN CALCIUM 20 MG/1
20 TABLET, FILM COATED ORAL DAILY
Qty: 90 TABLET | Refills: 3 | Status: CANCELLED | OUTPATIENT
Start: 2023-09-15

## 2023-09-15 RX ORDER — MULTIPLE VITAMINS W/ MINERALS TAB 9MG-400MCG
1 TAB ORAL DAILY
Qty: 90 TABLET | Refills: 3 | Status: SHIPPED | OUTPATIENT
Start: 2023-09-15

## 2023-09-15 ASSESSMENT — PAIN SCALES - GENERAL: PAINLEVEL: NO PAIN (0)

## 2023-09-15 NOTE — PROGRESS NOTES
Assessment & Plan     (Z00.00) Routine general medical examination at a health care facility  (primary encounter diagnosis)  Comment:  Reviewed the need for periodic healthcare exams and screenings.   Plan: REVIEW OF HEALTH MAINTENANCE PROTOCOL ORDERS,         multivitamin w/minerals (THERA-VIT-M) tablet        Advised yearly check ups.     (F10.280) Alcohol dependence with alcohol-induced anxiety disorder (H)  Comment: History of occasional relapses, currently maintaining sobriety.  Exercising on a regular basis and feels motivated to not to drink alcohol. He feels he benefits from naltrexone, will continue.  Plan: naltrexone (DEPADE/REVIA) 50 MG tablet        Continue.    (I10) Hypertension, goal below 140/90  Comment: Within guidelines using 2 drug regimen.  Plan: lisinopril (ZESTRIL) 40 MG tablet, amLODIPine         (NORVASC) 5 MG tablet        2.    (L65.9) Hair loss  Comment: Following finasteride.  Patient is taking 1/4 tablet daily.  Plan: finasteride (PROSCAR) 5 MG tablet        Continue.    (E78.5) Hyperlipidemia with target LDL less than 130  Comment: Given family history and recent positive coronary artery calcium score, will increase potency of statin therapy to high intensity.  Increase atorvastatin from 20 mg to 40 mg daily  Plan: atorvastatin (LIPITOR) 40 MG tablet            (R73.09) Abnormal glucose  Comment: No signs of diabetes.  Lab Results   Component Value Date    A1C 5.6 09/15/2023    A1C 5.7 01/05/2023    A1C 5.4 03/19/2021      Plan: Hemoglobin A1c            (Z12.5) Screening for prostate cancer  Comment:   PSA   Date Value Ref Range Status   03/19/2021 1.57 0 - 4 ug/L Final     Comment:     Assay Method:  Chemiluminescence using Siemens Vista analyzer     Prostate Specific Antigen Screen   Date Value Ref Range Status   01/05/2023 2.10 0.00 - 4.00 ug/L Final       Plan: Repeat yearly.    (E78.5) Hyperlipidemia LDL goal <70  Comment: We will recheck lipid profile in 2 months.  Plan:  Continue atorvastatin 40 mg daily.    (R93.1) Abnormal nuclear cardiac imaging test  Comment: Concerning heart scan, especially in the LAD.  Plan: Adult Cardiology Eval  Referral        Refer to cardiology for consultation.    (Z23) Need for Streptococcus pneumoniae vaccination  Plan: PNEUMOCOCCAL 20 VALENT CONJUGATE (PREVNAR 20)            (Z23) Need for hepatitis A and B vaccination  Plan: HEP A & B ([place])            (Z23) Need for influenza vaccination  Plan: INFLUENZA VACCINE 18-64Y ([place])            Results for orders placed or performed in visit on 09/15/23   Hemoglobin A1c     Status: Normal   Result Value Ref Range    Hemoglobin A1C 5.6 0.0 - 5.6 %        Patient Instructions   The calcium score is concerning.     Let's increase the atorvastatin to 40 mg daily.     See cardiology: [facility] will call you to coordinate your care as prescribed by your provider. If you don't hear from a representative within 2 business days, please call [phone].     Blood tests today.     Pneumonia shot, flu shot, and hepatitis A and B.        Shania Mclain MD  [facility] RAKESH Verma is a 53 year old, presenting for the following health issues:  Calcium scan (Patient is fasting)        9/15/2023    10:46 AM   Additional Questions   Roomed by Linda Moscoso, Student MA and Yaneth   Accompanied by ludmila         9/15/2023    10:46 AM   Patient Reported Additional Medications   Patient reports taking the following new medications Mirazopam 7.5 mg       History of Present Illness       Reason for visit:  Heart scan shows significant calcium build up in the left anterior descending artery placing me at high risk for a heart attack.  Calcium score of 570 for left anterior descending artery and 630 for a total calcium score.  Symptom onset:  More than a month  Symptoms include:  Heat scan resultsHe consumes 0 sweetened beverage(s) daily.He exercises with enough effort to  "increase his heart rate 60 or more minutes per day.  He exercises with enough effort to increase his heart rate 6 days per week.   He is taking medications regularly.           Review of Systems   Constitutional, HEENT, cardiovascular, pulmonary, gi and gu systems are negative, except as otherwise noted.      Objective    /64 (BP Location: Right arm, Patient Position: Sitting, Cuff Size: Adult Regular)   Pulse 69   Temp 98  F (36.7  C) (Temporal)   Resp 12   Ht 1.723 m (5' 7.84\")   Wt 71.7 kg (158 lb)   SpO2 99%   BMI 24.14 kg/m    Body mass index is 24.14 kg/m .  Physical Exam   GENERAL: Healthy, alert and no distress  EYES: Eyes grossly normal to inspection, conjunctivae and sclerae normal  RESP: Lungs clear to auscultation - no rales, rhonchi or wheezes  CV: Regular rate and rhythm, normal S1 S2, no murmur  MS: No gross musculoskeletal defects noted, no edema  NEURO: Normal strength and tone, mentation intact and speech normal  PSYCH: Mentation appears normal, affect normal/bright       Shania Mclain MD         "

## 2023-09-15 NOTE — PATIENT INSTRUCTIONS
The calcium score is concerning.     Let's increase the atorvastatin to 40 mg daily.     See cardiology: [facility] will call you to coordinate your care as prescribed by your provider. If you don't hear from a representative within 2 business days, please call [phone].     Blood tests today.     Pneumonia shot, flu shot, and hepatitis A and B.
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
## 2023-09-27 ENCOUNTER — OFFICE VISIT (OUTPATIENT)
Dept: CARDIOLOGY | Facility: CLINIC | Age: 53
End: 2023-09-27
Attending: FAMILY MEDICINE
Payer: COMMERCIAL

## 2023-09-27 VITALS
HEART RATE: 78 BPM | BODY MASS INDEX: 25.49 KG/M2 | SYSTOLIC BLOOD PRESSURE: 133 MMHG | DIASTOLIC BLOOD PRESSURE: 78 MMHG | OXYGEN SATURATION: 100 % | WEIGHT: 166.8 LBS

## 2023-09-27 DIAGNOSIS — E78.5 HYPERLIPIDEMIA LDL GOAL <70: ICD-10-CM

## 2023-09-27 DIAGNOSIS — I10 HYPERTENSION, WELL CONTROLLED: ICD-10-CM

## 2023-09-27 DIAGNOSIS — R93.1 HIGH CORONARY ARTERY CALCIUM SCORE: Primary | ICD-10-CM

## 2023-09-27 PROCEDURE — 99417 PROLNG OP E/M EACH 15 MIN: CPT | Performed by: INTERNAL MEDICINE

## 2023-09-27 PROCEDURE — 99205 OFFICE O/P NEW HI 60 MIN: CPT | Performed by: INTERNAL MEDICINE

## 2023-09-27 RX ORDER — MIRTAZAPINE 7.5 MG/1
TABLET, FILM COATED ORAL
COMMUNITY
Start: 2023-08-25 | End: 2024-10-01

## 2023-09-27 RX ORDER — ASPIRIN 81 MG/1
81 TABLET ORAL DAILY
Refills: 3 | COMMUNITY
Start: 2023-09-27

## 2023-09-27 RX ORDER — ALPRAZOLAM 1 MG
1 TABLET ORAL 2 TIMES DAILY PRN
COMMUNITY
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
## 2023-09-27 NOTE — NURSING NOTE
Patient medication list states he is taking Trazodone and Mirtazapine. Medication contradiction alert popped up. Writer verbally notified RN and MD.    RUPINDER Stockton

## 2023-09-27 NOTE — PATIENT INSTRUCTIONS
Thank you for coming to the [facility] at [place]; please note the following instructions:    1. Exercise stress Echocardiogram    2. Fasting cholesterol in 2 months    3. Call clinic if experiencing chest pain or pressure    4. Cardiology follow up as needed    5. TAKE ASA 81 MG DAILY            If you have any questions regarding your visit, please contact your care team:     CARDIOLOGY  TELEPHONE NUMBER   Debbie SHANNON, Registered Nurse  Tg LIMA, Registered Nurse  Vianca ALVARADO, Registered Nurse  Lena PEREZ, Registered Medical Assistant  Mahogany GARCÍA, Certified Medical Assistant  Niurka GARVIN, Visit Facilitator [phone] (select option 1)    *After hours: [phone]   For Scheduling Appts:     [phone] (select option 1)    *After hours: [phone]   For the Device Clinic (Pacemakers and ICD's)  Roselyn CARLSON, Registered Nurse   During business hours: [phone]    *After business hours:  [phone] (select option 4)      Normal test result notifications will be released via [employer] or mailed within 7 business days.  All other test results, will be communicated via telephone once reviewed by your cardiologist.    If you need a medication refill, please contact your pharmacy.  Please allow 3 business days for your refill to be completed.    As always, thank you for trusting us with your health care needs!

## 2023-09-27 NOTE — NURSING NOTE
"Chief Complaint   Patient presents with    New Patient     Reason for visit: New general cardiology for Abnormal nuclear cardiac [facility] test [facility] 8/2023       Initial /78 (BP Location: Right arm, Patient Position: Sitting, Cuff Size: Adult Regular)   Pulse 78   Wt 75.7 kg (166 lb 12.8 oz)   SpO2 100%   BMI 25.49 kg/m   Estimated body mass index is 25.49 kg/m  as calculated from the following:    Height as of 9/15/23: 1.723 m (5' 7.84\").    Weight as of this encounter: 75.7 kg (166 lb 12.8 oz)..  BP completed using cuff size: regular    RUPINDER Osei    "

## 2023-10-25 ENCOUNTER — ANCILLARY PROCEDURE (OUTPATIENT)
Dept: CARDIOLOGY | Facility: CLINIC | Age: 53
End: 2023-10-25
Attending: INTERNAL MEDICINE
Payer: COMMERCIAL

## 2023-10-25 ENCOUNTER — TRANSFERRED RECORDS (OUTPATIENT)
Dept: HEALTH INFORMATION MANAGEMENT | Facility: CLINIC | Age: 53
End: 2023-10-25

## 2023-10-25 DIAGNOSIS — R93.1 HIGH CORONARY ARTERY CALCIUM SCORE: ICD-10-CM

## 2023-10-25 PROCEDURE — 93016 CV STRESS TEST SUPVJ ONLY: CPT | Performed by: INTERNAL MEDICINE

## 2023-10-25 PROCEDURE — 93017 CV STRESS TEST TRACING ONLY: CPT | Performed by: INTERNAL MEDICINE

## 2023-10-25 PROCEDURE — 93321 DOPPLER ECHO F-UP/LMTD STD: CPT | Mod: TC | Performed by: INTERNAL MEDICINE

## 2023-10-25 PROCEDURE — 93352 ADMIN ECG CONTRAST AGENT: CPT | Performed by: INTERNAL MEDICINE

## 2023-10-25 PROCEDURE — 93018 CV STRESS TEST I&R ONLY: CPT | Performed by: INTERNAL MEDICINE

## 2023-10-25 PROCEDURE — 93325 DOPPLER ECHO COLOR FLOW MAPG: CPT | Mod: TC | Performed by: INTERNAL MEDICINE

## 2023-10-25 PROCEDURE — 93350 STRESS TTE ONLY: CPT | Mod: 26 | Performed by: INTERNAL MEDICINE

## 2023-10-25 PROCEDURE — 93321 DOPPLER ECHO F-UP/LMTD STD: CPT | Mod: 26 | Performed by: INTERNAL MEDICINE

## 2023-10-25 PROCEDURE — 99207 PR STATISTIC IV PUSH SINGLE INITIAL SUBSTANCE: CPT | Performed by: INTERNAL MEDICINE

## 2023-10-25 PROCEDURE — 93350 STRESS TTE ONLY: CPT | Mod: TC | Performed by: INTERNAL MEDICINE

## 2023-10-25 PROCEDURE — 93325 DOPPLER ECHO COLOR FLOW MAPG: CPT | Mod: 26 | Performed by: INTERNAL MEDICINE

## 2023-10-25 RX ADMIN — Medication 3 ML: at 08:59

## 2023-10-30 DIAGNOSIS — E78.5 HYPERLIPIDEMIA WITH TARGET LDL LESS THAN 130: ICD-10-CM

## 2023-10-30 RX ORDER — ATORVASTATIN CALCIUM 20 MG/1
20 TABLET, FILM COATED ORAL DAILY
Qty: 90 TABLET | Refills: 3 | OUTPATIENT
Start: 2023-10-30

## 2023-11-29 ENCOUNTER — LAB (OUTPATIENT)
Dept: LAB | Facility: CLINIC | Age: 53
End: 2023-11-29
Payer: COMMERCIAL

## 2023-11-29 DIAGNOSIS — E78.5 HYPERLIPIDEMIA WITH TARGET LDL LESS THAN 130: ICD-10-CM

## 2023-11-29 DIAGNOSIS — R93.1 HIGH CORONARY ARTERY CALCIUM SCORE: ICD-10-CM

## 2023-11-29 LAB
ALT SERPL W P-5'-P-CCNC: 47 U/L (ref 0–70)
APO A-I SERPL-MCNC: 62 MG/DL
AST SERPL W P-5'-P-CCNC: 42 U/L (ref 0–45)
CHOLEST SERPL-MCNC: 159 MG/DL
HDLC SERPL-MCNC: 64 MG/DL
LDLC SERPL CALC-MCNC: 88 MG/DL
NONHDLC SERPL-MCNC: 95 MG/DL
TRIGL SERPL-MCNC: 37 MG/DL

## 2023-11-29 PROCEDURE — 84460 ALANINE AMINO (ALT) (SGPT): CPT

## 2023-11-29 PROCEDURE — 83695 ASSAY OF LIPOPROTEIN(A): CPT

## 2023-11-29 PROCEDURE — 80061 LIPID PANEL: CPT

## 2023-11-29 PROCEDURE — 36415 COLL VENOUS BLD VENIPUNCTURE: CPT

## 2023-11-29 PROCEDURE — 84450 TRANSFERASE (AST) (SGOT): CPT

## 2023-12-23 DIAGNOSIS — E78.5 HYPERLIPIDEMIA WITH TARGET LDL LESS THAN 130: ICD-10-CM

## 2023-12-26 RX ORDER — ATORVASTATIN CALCIUM 20 MG/1
20 TABLET, FILM COATED ORAL DAILY
Qty: 90 TABLET | Refills: 3 | OUTPATIENT
Start: 2023-12-26

## 2024-02-01 DIAGNOSIS — E78.5 HYPERLIPIDEMIA WITH TARGET LDL LESS THAN 130: ICD-10-CM

## 2024-02-02 RX ORDER — ATORVASTATIN CALCIUM 20 MG/1
20 TABLET, FILM COATED ORAL DAILY
Qty: 90 TABLET | Refills: 3 | Status: SHIPPED | OUTPATIENT
Start: 2024-02-02 | End: 2024-10-01

## 2024-09-19 DIAGNOSIS — I10 HYPERTENSION, GOAL BELOW 140/90: ICD-10-CM

## 2024-09-19 RX ORDER — AMLODIPINE BESYLATE 5 MG/1
5 TABLET ORAL DAILY
Qty: 90 TABLET | Refills: 3 | Status: SHIPPED | OUTPATIENT
Start: 2024-09-19

## 2024-09-28 SDOH — HEALTH STABILITY: PHYSICAL HEALTH: ON AVERAGE, HOW MANY DAYS PER WEEK DO YOU ENGAGE IN MODERATE TO STRENUOUS EXERCISE (LIKE A BRISK WALK)?: 6 DAYS

## 2024-09-28 SDOH — HEALTH STABILITY: PHYSICAL HEALTH: ON AVERAGE, HOW MANY MINUTES DO YOU ENGAGE IN EXERCISE AT THIS LEVEL?: 100 MIN

## 2024-09-28 ASSESSMENT — SOCIAL DETERMINANTS OF HEALTH (SDOH): HOW OFTEN DO YOU GET TOGETHER WITH FRIENDS OR RELATIVES?: ONCE A WEEK

## 2024-10-01 ENCOUNTER — OFFICE VISIT (OUTPATIENT)
Dept: FAMILY MEDICINE | Facility: CLINIC | Age: 54
End: 2024-10-01
Payer: COMMERCIAL

## 2024-10-01 VITALS
DIASTOLIC BLOOD PRESSURE: 70 MMHG | WEIGHT: 170.38 LBS | BODY MASS INDEX: 25.82 KG/M2 | OXYGEN SATURATION: 100 % | SYSTOLIC BLOOD PRESSURE: 128 MMHG | RESPIRATION RATE: 20 BRPM | HEART RATE: 97 BPM | HEIGHT: 68 IN | TEMPERATURE: 97.5 F

## 2024-10-01 DIAGNOSIS — F41.1 GENERALIZED ANXIETY DISORDER: ICD-10-CM

## 2024-10-01 DIAGNOSIS — F10.280 ALCOHOL DEPENDENCE WITH ALCOHOL-INDUCED ANXIETY DISORDER (H): ICD-10-CM

## 2024-10-01 DIAGNOSIS — Z00.00 ROUTINE GENERAL MEDICAL EXAMINATION AT A HEALTH CARE FACILITY: Primary | ICD-10-CM

## 2024-10-01 DIAGNOSIS — Z12.5 SCREENING FOR PROSTATE CANCER: ICD-10-CM

## 2024-10-01 DIAGNOSIS — I10 HYPERTENSION, GOAL BELOW 140/90: ICD-10-CM

## 2024-10-01 DIAGNOSIS — A69.20 LYME DISEASE: ICD-10-CM

## 2024-10-01 DIAGNOSIS — R73.09 ABNORMAL GLUCOSE: ICD-10-CM

## 2024-10-01 DIAGNOSIS — E78.5 HYPERLIPIDEMIA LDL GOAL <70: ICD-10-CM

## 2024-10-01 LAB
ANION GAP SERPL CALCULATED.3IONS-SCNC: 11 MMOL/L (ref 7–15)
BUN SERPL-MCNC: 34.2 MG/DL (ref 6–20)
CALCIUM SERPL-MCNC: 9.4 MG/DL (ref 8.8–10.4)
CHLORIDE SERPL-SCNC: 105 MMOL/L (ref 98–107)
CHOLEST SERPL-MCNC: 148 MG/DL
CREAT SERPL-MCNC: 1.26 MG/DL (ref 0.67–1.17)
EGFRCR SERPLBLD CKD-EPI 2021: 68 ML/MIN/1.73M2
EST. AVERAGE GLUCOSE BLD GHB EST-MCNC: 120 MG/DL
FASTING STATUS PATIENT QL REPORTED: YES
FASTING STATUS PATIENT QL REPORTED: YES
GLUCOSE SERPL-MCNC: 117 MG/DL (ref 70–99)
HBA1C MFR BLD: 5.8 % (ref 0–5.6)
HCO3 SERPL-SCNC: 22 MMOL/L (ref 22–29)
HDLC SERPL-MCNC: 61 MG/DL
LDLC SERPL CALC-MCNC: 78 MG/DL
NONHDLC SERPL-MCNC: 87 MG/DL
POTASSIUM SERPL-SCNC: 4.7 MMOL/L (ref 3.4–5.3)
PSA SERPL DL<=0.01 NG/ML-MCNC: 1.42 NG/ML (ref 0–3.5)
SODIUM SERPL-SCNC: 138 MMOL/L (ref 135–145)
TRIGL SERPL-MCNC: 44 MG/DL

## 2024-10-01 PROCEDURE — 83036 HEMOGLOBIN GLYCOSYLATED A1C: CPT | Performed by: FAMILY MEDICINE

## 2024-10-01 PROCEDURE — 36415 COLL VENOUS BLD VENIPUNCTURE: CPT | Performed by: FAMILY MEDICINE

## 2024-10-01 PROCEDURE — 99396 PREV VISIT EST AGE 40-64: CPT | Performed by: FAMILY MEDICINE

## 2024-10-01 PROCEDURE — 80048 BASIC METABOLIC PNL TOTAL CA: CPT | Performed by: FAMILY MEDICINE

## 2024-10-01 PROCEDURE — 82570 ASSAY OF URINE CREATININE: CPT | Performed by: FAMILY MEDICINE

## 2024-10-01 PROCEDURE — 80061 LIPID PANEL: CPT | Performed by: FAMILY MEDICINE

## 2024-10-01 PROCEDURE — 82043 UR ALBUMIN QUANTITATIVE: CPT | Performed by: FAMILY MEDICINE

## 2024-10-01 PROCEDURE — 99213 OFFICE O/P EST LOW 20 MIN: CPT | Mod: 25 | Performed by: FAMILY MEDICINE

## 2024-10-01 PROCEDURE — G0103 PSA SCREENING: HCPCS | Performed by: FAMILY MEDICINE

## 2024-10-01 RX ORDER — MIRTAZAPINE 15 MG/1
15 TABLET, FILM COATED ORAL AT BEDTIME
COMMUNITY
Start: 2024-10-01

## 2024-10-01 RX ORDER — BUSPIRONE HYDROCHLORIDE 10 MG/1
20 TABLET ORAL 3 TIMES DAILY
COMMUNITY
Start: 2024-10-01

## 2024-10-01 ASSESSMENT — PAIN SCALES - GENERAL: PAINLEVEL: NO PAIN (0)

## 2024-10-01 NOTE — PROGRESS NOTES
Preventive Care Visit  [facility] RAKESH Mclain MD, Family Medicine  Oct 1, 2024      Assessment & Plan     (Z00.00) Routine general medical examination at a health care facility  (primary encounter diagnosis)  Comment:  Reviewed the need for periodic healthcare exams and screenings.   Plan: REVIEW OF HEALTH MAINTENANCE PROTOCOL ORDERS        Advise yearly physicals.    (I10) Hypertension, goal below 140/90  Comment: Within guidelines using 2 drug regimen: Amlodipine plus ACE inhibitor.  Plan: BASIC METABOLIC PANEL, Albumin Random Urine         Quantitative with Creat Ratio        Continue.  Await test results.    (F10.280) Alcohol dependence with alcohol-induced anxiety disorder (H)  Comment: Maintaining sobriety.  Plan: Monitor.  Continue BuSpar 10 naltrexone.      (Z12.5) Screening for prostate cancer  Plan: PSA, screen        Await test results.    (E78.5) Hyperlipidemia LDL goal <70  Comment: Elevated coronary artery calcium score, no documented ischemic event.  Negative stress echocardiogram.  Tolerating high intensity statin therapy well.  Plan: Lipid panel reflex to direct LDL Fasting        Continue.  Very good lipid profile.     (R73.09) Abnormal glucose  Comment: Patient would be considered prediabetic.  Plan: Hemoglobin A1c        Reviewed lifestyle.    (F41.1) Generalized anxiety disorder  Comment: Followed by psychiatry.  Doing well with current regiment.  Plan: busPIRone (BUSPAR) 10 MG tablet, mirtazapine         (REMERON) 15 MG tablet        Continue current management.    (A69.20) Lyme disease  Comment: Single episode, treated promptly.  Plan: No obvious sequelae.  Monitor.    Results for orders placed or performed in visit on 10/01/24   PSA, screen     Status: Normal   Result Value Ref Range    Prostate Specific Antigen Screen 1.42 0.00 - 3.50 ng/mL    Narrative    This result is obtained using the Roche Elecsys total PSA method on the katerine e801 immunoassay analyzer,  which is an ultrasensitive method. Results obtained with different assay methods or kits cannot be used interchangeably.  This test is intended for initial prostate cancer screening. PSA values exceeding the age-specific limits are suspicious for prostate disease, but additional testing, such as prostate biopsy, is needed to diagnose prostate pathology. The American Cancer Society recommends annual examination with digital rectal examination and serum PSA beginning at age 50 and for men with a life expectancy of at least 10 years after detection of prostate cancer. For men in high-risk groups, such as  Americans or men with a first-degree relative diagnosed at a younger age, testing should begin at a younger age. It is generally recommended that information be provided to patients about the benefits and limitations of testing and treatment so they can make informed decisions.   Hemoglobin A1c     Status: Abnormal   Result Value Ref Range    Estimated Average Glucose 120 (H) <117 mg/dL    Hemoglobin A1C 5.8 (H) 0.0 - 5.6 %   Lipid panel reflex to direct LDL Fasting     Status: None   Result Value Ref Range    Cholesterol 148 <200 mg/dL    Triglycerides 44 <150 mg/dL    Direct Measure HDL 61 >=40 mg/dL    LDL Cholesterol Calculated 78 <100 mg/dL    Non HDL Cholesterol 87 <130 mg/dL    Patient Fasting > 8hrs? Yes     Narrative    Cholesterol  Desirable: < 200 mg/dL  Borderline High: 200 - 239 mg/dL  High: >= 240 mg/dL    Triglycerides  Normal: < 150 mg/dL  Borderline High: 150 - 199 mg/dL  High: 200-499 mg/dL  Very High: >= 500 mg/dL    Direct Measure HDL  Female: >= 50 mg/dL   Male: >= 40 mg/dL    LDL Cholesterol  Desirable: < 100 mg/dL  Above Desirable: 100 - 129 mg/dL   Borderline High: 130 - 159 mg/dL   High:  160 - 189 mg/dL   Very High: >= 190 mg/dL    Non HDL Cholesterol  Desirable: < 130 mg/dL  Above Desirable: 130 - 159 mg/dL  Borderline High: 160 - 189 mg/dL  High: 190 - 219 mg/dL  Very High: >= 220  "mg/dL   BASIC METABOLIC PANEL     Status: Abnormal   Result Value Ref Range    Sodium 138 135 - 145 mmol/L    Potassium 4.7 3.4 - 5.3 mmol/L    Chloride 105 98 - 107 mmol/L    Carbon Dioxide (CO2) 22 22 - 29 mmol/L    Anion Gap 11 7 - 15 mmol/L    Urea Nitrogen 34.2 (H) 6.0 - 20.0 mg/dL    Creatinine 1.26 (H) 0.67 - 1.17 mg/dL    GFR Estimate 68 >60 mL/min/1.73m2    Calcium 9.4 8.8 - 10.4 mg/dL    Glucose 117 (H) 70 - 99 mg/dL    Patient Fasting > 8hrs? Yes       Patient Instructions   Overall, you're doing well.     No change in medications.     Blood and urine tests today.     Yearly check up.      Patient has been advised of split billing requirements and indicates understanding: Yes        BMI  Estimated body mass index is 26.12 kg/m  as calculated from the following:    Height as of this encounter: 1.72 m (5' 7.72\").    Weight as of this encounter: 77.3 kg (170 lb 6 oz).       Counseling  Appropriate preventive services were addressed with this patient via screening, questionnaire, or discussion as appropriate for fall prevention, nutrition, physical activity, Tobacco-use cessation, social engagement, weight loss and cognition.  Checklist reviewing preventive services available has been given to the patient.  Reviewed patient's diet, addressing concerns and/or questions.   The patient was instructed to see the dentist every 6 months.           Belle Verma is a 54 year old, presenting for the following:  Physical        10/1/2024     9:30 AM   Additional Questions   Roomed by Shania Dennison CMA   Accompanied by None         10/1/2024     9:30 AM   Patient Reported Additional Medications   Patient reports taking the following new medications None        Health Care Directive  Patient does not have a Health Care Directive or Living Will: Discussed advance care planning with patient; however, patient declined at this time.    HPI              9/28/2024   General Health   How would you rate your overall physical " health? Good   Feel stress (tense, anxious, or unable to sleep) To some extent      (!) STRESS CONCERN      9/28/2024   Nutrition   Three or more servings of calcium each day? Yes   Diet: Other   If other, please elaborate: High protein low carb diet.   How many servings of fruit and vegetables per day? (!) 2-3   How many sweetened beverages each day? 0-1            9/28/2024   Exercise   Days per week of moderate/strenous exercise 6 days   Average minutes spent exercising at this level 100 min            9/28/2024   Social Factors   Frequency of gathering with friends or relatives Once a week   Worry food won't last until get money to buy more No   Food not last or not have enough money for food? No   Do you have housing? (Housing is defined as stable permanent housing and does not include staying ouside in a car, in a tent, in an abandoned building, in an overnight shelter, or couch-surfing.) Yes   Are you worried about losing your housing? No   Lack of transportation? No   Unable to get utilities (heat,electricity)? No            9/28/2024   Fall Risk   Fallen 2 or more times in the past year? No   Trouble with walking or balance? No             9/28/2024   Dental   Dentist two times every year? (!) NO            9/28/2024   TB Screening   Were you born outside of the [place]? No            Today's PHQ-2 Score:       9/30/2024    10:57 AM   PHQ-2 ( 1999 [employer])   Q1: Little interest or pleasure in doing things 1   Q2: Feeling down, depressed or hopeless 1   PHQ-2 Score 2   Q1: Little interest or pleasure in doing things Several days   Q2: Feeling down, depressed or hopeless Several days   PHQ-2 Score 2           9/28/2024   Substance Use   Alcohol more than 3/day or more than 7/wk Not Applicable   Do you use any other substances recreationally? No        Social History     Tobacco Use    Smoking status: Former     Types: Dip, chew, snus or snuff     Passive exposure: Never    Smokeless tobacco: Former     Quit date:  "11/15/2014   Vaping Use    Vaping status: Never Used   Substance Use Topics    Alcohol use: Not Currently     Comment: I abstain from use.    Drug use: No             9/28/2024   One time HIV Screening   Previous HIV test? No          9/28/2024   STI Screening   New sexual partner(s) since last STI/HIV test? No      ASCVD Risk   The 10-year ASCVD risk score (Ed ALDRICH, et al., 2019) is: 3.2%    Values used to calculate the score:      Age: 54 years      Sex: Male      Is Non- : No      Diabetic: No      Tobacco smoker: No      Systolic Blood Pressure: 128 mmHg      Is BP treated: Yes      HDL Cholesterol: 64 mg/dL      Total Cholesterol: 159 mg/dL           Reviewed and updated as needed this visit by Provider                    Lab work is in process  Labs reviewed in EPIC      Review of Systems  Constitutional, HEENT, cardiovascular, pulmonary, gi and gu systems are negative, except as otherwise noted.     Objective    Exam  /70   Pulse 97   Temp 97.5  F (36.4  C) (Temporal)   Resp 20   Ht 1.72 m (5' 7.72\")   Wt 77.3 kg (170 lb 6 oz)   SpO2 100%   BMI 26.12 kg/m     Estimated body mass index is 26.12 kg/m  as calculated from the following:    Height as of this encounter: 1.72 m (5' 7.72\").    Weight as of this encounter: 77.3 kg (170 lb 6 oz).    Physical Exam  GENERAL: Healthy, alert and no distress  EYES: Eyes grossly normal to inspection, conjunctivae and sclerae normal  RESP: Lungs clear to auscultation - no rales, rhonchi or wheezes  CV: Regular rate and rhythm, normal S1 S2, no murmur  MS: No gross musculoskeletal defects noted, no edema  NEURO: Normal strength and tone, mentation intact and speech normal  PSYCH: Mentation appears normal, affect normal/bright         Signed Electronically by: Shania Mclain MD    "

## 2024-10-01 NOTE — PATIENT INSTRUCTIONS
Overall, you're doing well.     No change in medications.     Blood and urine tests today.     Yearly check up.

## 2024-10-02 LAB
CREAT UR-MCNC: 206 MG/DL
MICROALBUMIN UR-MCNC: <12 MG/L
MICROALBUMIN/CREAT UR: NORMAL MG/G{CREAT}

## 2024-11-13 DIAGNOSIS — I10 HYPERTENSION, GOAL BELOW 140/90: ICD-10-CM

## 2024-11-13 DIAGNOSIS — E78.5 HYPERLIPIDEMIA, UNSPECIFIED: ICD-10-CM

## 2024-11-13 RX ORDER — LISINOPRIL 40 MG/1
40 TABLET ORAL DAILY
Qty: 90 TABLET | Refills: 2 | Status: SHIPPED | OUTPATIENT
Start: 2024-11-13

## 2024-11-13 RX ORDER — ATORVASTATIN CALCIUM 40 MG/1
TABLET, FILM COATED ORAL
Qty: 90 TABLET | Refills: 2 | Status: SHIPPED | OUTPATIENT
Start: 2024-11-13

## 2025-02-04 DIAGNOSIS — I10 HYPERTENSION, GOAL BELOW 140/90: ICD-10-CM

## 2025-02-04 RX ORDER — LISINOPRIL 40 MG/1
40 TABLET ORAL DAILY
Qty: 90 TABLET | Refills: 2 | Status: SHIPPED | OUTPATIENT
Start: 2025-02-04

## 2025-07-22 DIAGNOSIS — E78.5 HYPERLIPIDEMIA, UNSPECIFIED: ICD-10-CM

## 2025-07-22 DIAGNOSIS — I10 HYPERTENSION, GOAL BELOW 140/90: ICD-10-CM

## 2025-07-22 RX ORDER — AMLODIPINE BESYLATE 5 MG/1
5 TABLET ORAL DAILY
Qty: 90 TABLET | Refills: 3 | Status: SHIPPED | OUTPATIENT
Start: 2025-07-22

## 2025-07-22 NOTE — TELEPHONE ENCOUNTER
Requested Prescriptions   Pending Prescriptions Disp Refills    atorvastatin (LIPITOR) 40 MG tablet 90 tablet 2       There is no refill protocol information for this order       amLODIPine (NORVASC) 5 MG tablet 90 tablet 3     Sig: Take 1 tablet (5 mg) by mouth daily. for blood pressure       There is no refill protocol information for this order        Shania Moore   [facility]

## 2025-07-23 RX ORDER — ATORVASTATIN CALCIUM 40 MG/1
40 TABLET, FILM COATED ORAL DAILY
Qty: 90 TABLET | Refills: 3 | Status: SHIPPED | OUTPATIENT
Start: 2025-07-23